# Patient Record
Sex: MALE | Race: WHITE | Employment: FULL TIME | URBAN - METROPOLITAN AREA
[De-identification: names, ages, dates, MRNs, and addresses within clinical notes are randomized per-mention and may not be internally consistent; named-entity substitution may affect disease eponyms.]

---

## 2020-09-25 ENCOUNTER — HOSPITAL ENCOUNTER (OUTPATIENT)
Dept: PREADMISSION TESTING | Age: 70
Discharge: HOME | End: 2020-09-25
Payer: MEDICARE

## 2020-09-25 VITALS
OXYGEN SATURATION: 94 % | DIASTOLIC BLOOD PRESSURE: 73 MMHG | SYSTOLIC BLOOD PRESSURE: 143 MMHG | WEIGHT: 235.89 LBS | HEIGHT: 72 IN | TEMPERATURE: 98.3 F | BODY MASS INDEX: 31.95 KG/M2 | HEART RATE: 64 BPM

## 2020-09-25 DIAGNOSIS — Z22.321 MSSA (METHICILLIN-SUSCEPTIBLE STAPH AUREUS) CARRIER: Primary | ICD-10-CM

## 2020-09-25 LAB
ABO + RH BLD: NORMAL
ANION GAP SERPL CALC-SCNC: 3 MMOL/L (ref 5–15)
APPEARANCE UR: CLEAR
ATRIAL RATE: 54 BPM
BACTERIA URNS QL MICRO: NEGATIVE /HPF
BILIRUB UR QL: NEGATIVE
BLOOD GROUP ANTIBODIES SERPL: NORMAL
BUN SERPL-MCNC: 23 MG/DL (ref 6–20)
BUN/CREAT SERPL: 22 (ref 12–20)
CALCIUM SERPL-MCNC: 9.1 MG/DL (ref 8.5–10.1)
CALCULATED P AXIS, ECG09: 65 DEGREES
CALCULATED T AXIS, ECG11: 32 DEGREES
CHLORIDE SERPL-SCNC: 109 MMOL/L (ref 97–108)
CO2 SERPL-SCNC: 28 MMOL/L (ref 21–32)
COLOR UR: NORMAL
CREAT SERPL-MCNC: 1.04 MG/DL (ref 0.7–1.3)
DIAGNOSIS, 93000: NORMAL
EPITH CASTS URNS QL MICRO: NORMAL /LPF
ERYTHROCYTE [DISTWIDTH] IN BLOOD BY AUTOMATED COUNT: 12.2 % (ref 11.5–14.5)
EST. AVERAGE GLUCOSE BLD GHB EST-MCNC: 108 MG/DL
GLUCOSE SERPL-MCNC: 118 MG/DL (ref 65–100)
GLUCOSE UR STRIP.AUTO-MCNC: NEGATIVE MG/DL
HBA1C MFR BLD: 5.4 % (ref 4–5.6)
HCT VFR BLD AUTO: 42.8 % (ref 36.6–50.3)
HGB BLD-MCNC: 14.5 G/DL (ref 12.1–17)
HGB UR QL STRIP: NEGATIVE
HYALINE CASTS URNS QL MICRO: NORMAL /LPF (ref 0–5)
INR PPP: 1 (ref 0.9–1.1)
KETONES UR QL STRIP.AUTO: NEGATIVE MG/DL
LEUKOCYTE ESTERASE UR QL STRIP.AUTO: NEGATIVE
MCH RBC QN AUTO: 31 PG (ref 26–34)
MCHC RBC AUTO-ENTMCNC: 33.9 G/DL (ref 30–36.5)
MCV RBC AUTO: 91.5 FL (ref 80–99)
NITRITE UR QL STRIP.AUTO: NEGATIVE
NRBC # BLD: 0 K/UL (ref 0–0.01)
NRBC BLD-RTO: 0 PER 100 WBC
P-R INTERVAL, ECG05: 220 MS
PH UR STRIP: 5 [PH] (ref 5–8)
PLATELET # BLD AUTO: 221 K/UL (ref 150–400)
PMV BLD AUTO: 9.6 FL (ref 8.9–12.9)
POTASSIUM SERPL-SCNC: 4.2 MMOL/L (ref 3.5–5.1)
PROT UR STRIP-MCNC: NEGATIVE MG/DL
PROTHROMBIN TIME: 10.6 SEC (ref 9–11.1)
Q-T INTERVAL, ECG07: 414 MS
QRS DURATION, ECG06: 88 MS
QTC CALCULATION (BEZET), ECG08: 392 MS
RBC # BLD AUTO: 4.68 M/UL (ref 4.1–5.7)
RBC #/AREA URNS HPF: NORMAL /HPF (ref 0–5)
SODIUM SERPL-SCNC: 140 MMOL/L (ref 136–145)
SP GR UR REFRACTOMETRY: 1.02 (ref 1–1.03)
SPECIMEN EXP DATE BLD: NORMAL
UA: UC IF INDICATED,UAUC: NORMAL
UROBILINOGEN UR QL STRIP.AUTO: 0.2 EU/DL (ref 0.2–1)
VENTRICULAR RATE, ECG03: 54 BPM
WBC # BLD AUTO: 4.8 K/UL (ref 4.1–11.1)
WBC URNS QL MICRO: NORMAL /HPF (ref 0–4)

## 2020-09-25 PROCEDURE — 83036 HEMOGLOBIN GLYCOSYLATED A1C: CPT

## 2020-09-25 PROCEDURE — 93005 ELECTROCARDIOGRAM TRACING: CPT

## 2020-09-25 PROCEDURE — 86900 BLOOD TYPING SEROLOGIC ABO: CPT

## 2020-09-25 PROCEDURE — 85610 PROTHROMBIN TIME: CPT

## 2020-09-25 PROCEDURE — 81001 URINALYSIS AUTO W/SCOPE: CPT

## 2020-09-25 PROCEDURE — 80048 BASIC METABOLIC PNL TOTAL CA: CPT

## 2020-09-25 PROCEDURE — 85027 COMPLETE CBC AUTOMATED: CPT

## 2020-09-25 RX ORDER — BISMUTH SUBSALICYLATE 262 MG
1 TABLET,CHEWABLE ORAL DAILY
COMMUNITY

## 2020-09-25 RX ORDER — OMEGA-3 FATTY ACIDS/FISH OIL 300-500 MG
1 CAPSULE ORAL DAILY
COMMUNITY

## 2020-09-25 RX ORDER — SILDENAFIL 100 MG/1
100 TABLET, FILM COATED ORAL AS NEEDED
COMMUNITY

## 2020-09-25 RX ORDER — DICLOFENAC SODIUM 50 MG/1
50 TABLET, DELAYED RELEASE ORAL AS NEEDED
COMMUNITY

## 2020-09-25 NOTE — PERIOP NOTES
Preoperative instructions reviewed with patient. Patient given two bottles of CHG soap. Instructions (reviewed/to be reviewed in class) on use of CHG soap. Patient given SSI infection FAQS sheet, as well as a MRSA/MSSA treatment instruction sheet with an explanation to patient that they will be notified if treatment instructions need to be initiated. Patient was given the opportunity to ask questions on the information provided. INFORMATION REGARDING COVID 19 TESTING PRIOR TO SURGERY WAS PROVIDED TO THE PATIENT WITH THE OPPORTUNITY FOR QUESTIONS. PATIENT VERBALIZED UNDERSTANDING.

## 2020-09-25 NOTE — PERIOP NOTES
PAT Nurse Practitioner   Pre-Operative Chart Review/Assessment:-ORTHOPEDIC                Patient Name:  Manuel Galarza. Age:   78 y.o.    :  1950     Today's Date:  2020     Date of PAT:   2020      Date of Surgery:    10/2/2020      Procedure(s):  Left Total Knee Arthroplasty     Surgeon:   Dr. Ana Rosa Hernandez                       PLAN:      1)  Medical Clearance:  Dr. Gregorio Santos      2)  Cardiac Clearance:  Not requested. 3)  Diabetic Treatment Consult:  Not indicated. A1c-5.4      4)  Sleep Apnea evaluation:   Not indicated. MIRANDA Score 3.       5) Treatment for MRSA/Staph Aureus:  +MSSA. Tx w/ mupirocin. 6) Additional Concerns: Mod EtOH (2 drinks/day)                Vital Signs:         Vitals:    20 0938   BP: (!) 143/73   Pulse: 64   Temp: 98.3 °F (36.8 °C)   SpO2: 94%   Weight: 107 kg (235 lb 14.3 oz)   Height: 6' (1.829 m)            ____________________________________________  PAST MEDICAL HISTORY  Past Medical History:   Diagnosis Date    Arthritis     Cancer ([address] 2018    SKIN CANCER      ____________________________________________  PAST SURGICAL HISTORY  Past Surgical History:   Procedure Laterality Date    HX ORTHOPAEDIC  1981    cervical fusion    HX ORTHOPAEDIC Left     SCREWS PLACED IN LEFT CLAVICLE    HX VASECTOMY        ____________________________________________  HOME MEDICATIONS  Current Outpatient Medications   Medication Sig    mupirocin (BACTROBAN) 2 % ointment by Both Nostrils route two (2) times a day for 7 days.  mupirocin (BACTROBAN) 2 % ointment by Both Nostrils route two (2) times a day. Indications: MSSA    sildenafil citrate (VIAGRA) 100 mg tablet Take 100 mg by mouth as needed for Erectile Dysfunction.  multivitamin (ONE A DAY) tablet Take 1 Tab by mouth daily.  fish oil-omega-3 fatty acids (Fish Oil) 300-500 mg cap Take 1 Cap by mouth daily.     diclofenac EC (VOLTAREN) 50 mg EC tablet Take 50 mg by mouth as needed for Pain. No current facility-administered medications for this encounter.       ____________________________________________  ALLERGIES  No Known Allergies   ____________________________________________  SOCIAL HISTORY  Social History     Tobacco Use    Smoking status: Never Smoker    Smokeless tobacco: Never Used   Substance Use Topics    Alcohol use: Yes     Alcohol/week: 15.0 standard drinks     Types: 14 Cans of beer, 1 Glasses of wine per week      ____________________________________________        Labs:     Hospital Outpatient Visit on 09/25/2020   Component Date Value Ref Range Status    Sodium 09/25/2020 140  136 - 145 mmol/L Final    Potassium 09/25/2020 4.2  3.5 - 5.1 mmol/L Final    Chloride 09/25/2020 109* 97 - 108 mmol/L Final    CO2 09/25/2020 28  21 - 32 mmol/L Final    Anion gap 09/25/2020 3* 5 - 15 mmol/L Final    Glucose 09/25/2020 118* 65 - 100 mg/dL Final    BUN 09/25/2020 23* 6 - 20 MG/DL Final    Creatinine 09/25/2020 1.04  0.70 - 1.30 MG/DL Final    BUN/Creatinine ratio 09/25/2020 22* 12 - 20   Final    GFR est AA 09/25/2020 >60  >60 ml/min/1.73m2 Final    GFR est non-AA 09/25/2020 >60  >60 ml/min/1.73m2 Final    Estimated GFR is calculated using the IDMS-traceable Modification of Diet in Renal Disease (MDRD) Study equation, reported for both  Americans (GFRAA) and non- Americans (GFRNA), and normalized to 1.73m2 body surface area. The physician must decide which value applies to the patient.     Calcium 09/25/2020 9.1  8.5 - 10.1 MG/DL Final    WBC 09/25/2020 4.8  4.1 - 11.1 K/uL Final    RBC 09/25/2020 4.68  4.10 - 5.70 M/uL Final    HGB 09/25/2020 14.5  12.1 - 17.0 g/dL Final    HCT 09/25/2020 42.8  36.6 - 50.3 % Final    MCV 09/25/2020 91.5  80.0 - 99.0 FL Final    MCH 09/25/2020 31.0  26.0 - 34.0 PG Final    MCHC 09/25/2020 33.9  30.0 - 36.5 g/dL Final    RDW 09/25/2020 12.2  11.5 - 14.5 % Final    PLATELET 09/25/2020 221  150 - 400 K/uL Final    MPV 09/25/2020 9.6  8.9 - 12.9 FL Final    NRBC 09/25/2020 0.0  0  WBC Final    ABSOLUTE NRBC 09/25/2020 0.00  0.00 - 0.01 K/uL Final    Crossmatch Expiration 09/25/2020 10/05/2020   Final    ABO/Rh(D) 09/25/2020 A NEGATIVE   Final    Antibody screen 09/25/2020 NEG   Final    INR 09/25/2020 1.0  0.9 - 1.1   Final    A single therapeutic range for Vit K antagonists may not be optimal for all indications - see June, 2008 issue of Chest, American College of Chest [employer]icians Evidence-Based Clinical Practice Guidelines, 8th Edition.  Prothrombin time 09/25/2020 10.6  9.0 - 11.1 sec Final    Color 09/25/2020 YELLOW/STRAW    Final    Color Reference Range: Straw, Yellow or Dark Yellow    Appearance 09/25/2020 CLEAR  CLEAR   Final    Specific gravity 09/25/2020 1.021  1.003 - 1.030   Final    pH (UA) 09/25/2020 5.0  5.0 - 8.0   Final    Protein 09/25/2020 Negative  NEG mg/dL Final    Glucose 09/25/2020 Negative  NEG mg/dL Final    Ketone 09/25/2020 Negative  NEG mg/dL Final    Bilirubin 09/25/2020 Negative  NEG   Final    Blood 09/25/2020 Negative  NEG   Final    Urobilinogen 09/25/2020 0.2  0.2 - 1.0 EU/dL Final    Nitrites 09/25/2020 Negative  NEG   Final    Leukocyte Esterase 09/25/2020 Negative  NEG   Final    UA:UC IF INDICATED 09/25/2020 CULTURE NOT INDICATED BY UA RESULT  CNI   Final    WBC 09/25/2020 0-4  0 - 4 /hpf Final    RBC 09/25/2020 0-5  0 - 5 /hpf Final    Epithelial cells 09/25/2020 FEW  FEW /lpf Final    Epithelial cell category consists of squamous cells and /or transitional urothelial cells. Renal tubular cells, if present, are separately identified as such.     Bacteria 09/25/2020 Negative  NEG /hpf Final    Hyaline cast 09/25/2020 0-2  0 - 5 /lpf Final    Ventricular Rate 09/25/2020 54  BPM Final    Atrial Rate 09/25/2020 54  BPM Final    P-R Interval 09/25/2020 220  ms Final    QRS Duration 09/25/2020 88  ms Final    Q-T Interval 09/25/2020 414  ms Final    QTC Calculation (Bezet) 09/25/2020 392  ms Final    Calculated P Axis 09/25/2020 65  degrees Final    Calculated T Axis 09/25/2020 32  degrees Final    Diagnosis 09/25/2020    Final                    Value:Sinus bradycardia with 1st degree AV block  Otherwise normal ECG  No previous ECGs available  Confirmed by Rosenda Barton MD. ([id number]) on 9/25/2020 4:05:03 PM      Hemoglobin A1c 09/25/2020 5.4  4.0 - 5.6 % Final    Comment: NEW METHOD  PLEASE NOTE NEW REFERENCE RANGE  (NOTE)  HbA1C Interpretive Ranges  <5.7              Normal  5.7 - 6.4         Consider Prediabetes  >6.5              Consider Diabetes      Est. average glucose 09/25/2020 108  mg/dL Final    Special Requests: 09/25/2020 NO SPECIAL REQUESTS    Final    Culture result: 09/25/2020 MRSA NOT PRESENT. Apparent Staphylococus aureus (not MRSA noted). Final       Skin:     Denies open wounds, cuts, sores, rashes or other areas of concern in PAT assessment.           Mega Marino NP

## 2020-09-26 LAB
BACTERIA SPEC CULT: NORMAL
BACTERIA SPEC CULT: NORMAL
SERVICE CMNT-IMP: NORMAL

## 2020-09-28 ENCOUNTER — HOSPITAL ENCOUNTER (OUTPATIENT)
Dept: PREADMISSION TESTING | Age: 70
Discharge: HOME | End: 2020-09-28
Payer: MEDICARE

## 2020-09-28 ENCOUNTER — TRANSCRIBE ORDER (OUTPATIENT)
Dept: REGISTRATION | Age: 70
End: 2020-09-28

## 2020-09-28 DIAGNOSIS — Z01.812 PRE-PROCEDURE LAB EXAM: Primary | ICD-10-CM

## 2020-09-28 DIAGNOSIS — Z01.812 PRE-PROCEDURE LAB EXAM: ICD-10-CM

## 2020-09-28 PROCEDURE — 87635 SARS-COV-2 COVID-19 AMP PRB: CPT

## 2020-09-28 RX ORDER — MUPIROCIN 20 MG/G
OINTMENT TOPICAL 2 TIMES DAILY
Qty: 22 G | Refills: 0 | Status: SHIPPED | OUTPATIENT
Start: 2020-09-28 | End: 2020-10-03

## 2020-09-28 RX ORDER — MUPIROCIN 20 MG/G
OINTMENT TOPICAL 2 TIMES DAILY
COMMUNITY
Start: 2020-09-28 | End: 2020-10-03

## 2020-09-28 NOTE — PERIOP NOTES
PC to pt regarding positive nasal cx (MSSA) and need to start Mupirocin ointment BID x 7 days to B nostrils starting today and bathe with CHG soap for 7 days prior to surgery. Pt verbalized understanding of instructions and will start today as recommended. Allergies and pharmacy of choice reviewed. Rx escribed to pt's pharmacy of choice. PTA medlist updated. Surgeon and PCP notified of positive culture and treatment.      Candelaria Cleveland NP

## 2020-09-29 LAB — SARS-COV-2, COV2NT: NOT DETECTED

## 2020-10-01 NOTE — H&P
Patient ID: Whit Moreira. is a 81 y.o. male. Pain Score:   4  Chief Complaint: Pain of the Left Knee and Pain of the Right Knee        59-year-old male with history of bilateral knee pain well known to me for severe osteoarthritis his bilateral knees. He has not been in to see us in about 3 years. He now is having increasing symptoms in his pain is bothersome when he tries to climb stairs and walk for long distances. He can not do hikes anymore and 1 of the things that got him in today was the fact that he can not a vacation because of concern about whether he could to do some things that he needed to. He also occasionally has problems sleeping at nighttime. Comes in for evaluation. His left knee is much worse than his right 1 is period     Medical History        Past Medical History:   Diagnosis Date    no relevant past medical history           Surgical History         Past Surgical History:   Procedure Laterality Date    NO RELEVANT SURGERIES        SPINE SURGERY                   Family History   Problem Relation Age of Onset    No Known Problems Mother      No Known Problems Father      No Known Problems Brother      No Known Problems Sister        Social History           Tobacco Use    Smoking status: Never Smoker    Smokeless tobacco: Never Used   Substance Use Topics    Alcohol use: Yes    Drug use: Never      Review of Systems   4/20/2020     Constitutional: Unexplained: Negative  Genitourinary: Frequent Urination: Negative  HEENT: Vision Loss: Negative  Neurological: Memory Loss: Negative  Integumentary: Rash: Negative  Cardiovascular: Palpatations: Negative  Hematologic: Bruises/Bleeds Easily: Negative  Gastrointestinal: Constipation: Negative  Immunological: Seasonal Allergies: Negative  Musculoskeletal: Joint Pain: Positive  Objective:      Vitals       Vitals:     04/20/20 0916   Weight: 225 lb   Height: 6'         Constitutional:  No acute distress. Well nourished.  Well developed. Psychiatric: Alert and oriented x3. Skin:  No marked skin ulcers. Neurological:  No apparent deficits       Patient Active Problem List    Diagnosis Date Noted    Degenerative arthritis of left knee 10/02/2020     Past Medical History:   Diagnosis Date    Arthritis     Cancer ([facility] [address] 2018    SKIN CANCER      Past Surgical History:   Procedure Laterality Date    HX ORTHOPAEDIC  1981    cervical fusion    HX ORTHOPAEDIC Left 2012    SCREWS PLACED IN LEFT CLAVICLE    HX VASECTOMY        Prior to Admission medications    Medication Sig Start Date End Date Taking? Authorizing Provider   mupirocin (BACTROBAN) 2 % ointment by Both Nostrils route two (2) times a day for 7 days. 9/28/20 10/5/20 Yes Dania Quill, NP   multivitamin (ONE A DAY) tablet Take 1 Tab by mouth daily. Yes Provider, Historical   fish oil-omega-3 fatty acids (Fish Oil) 300-500 mg cap Take 1 Cap by mouth daily. Yes Provider, Historical   mupirocin (BACTROBAN) 2 % ointment by Both Nostrils route two (2) times a day. Indications: MSSA 9/28/20 10/5/20  Dania Damonill, NP   sildenafil citrate (VIAGRA) 100 mg tablet Take 100 mg by mouth as needed for Erectile Dysfunction. Provider, Historical   diclofenac EC (VOLTAREN) 50 mg EC tablet Take 50 mg by mouth as needed for Pain. Provider, Historical     No Known Allergies   Social History     Tobacco Use    Smoking status: Never Smoker    Smokeless tobacco: Never Used   Substance Use Topics    Alcohol use:  Yes     Alcohol/week: 15.0 standard drinks     Types: 14 Cans of beer, 1 Glasses of wine per week      Family History   Problem Relation Age of Onset    Hypertension Father     Heart Disease Father     Arthritis-osteo Mother     No Known Problems Sister     Anesth Problems Neg Hx             Patient Vitals for the past 8 hrs:   BP Temp Pulse Resp SpO2 Height Weight   10/02/20 0715 132/88 -- (!) 52 16 96 % -- --   10/02/20 0708 138/86 -- (!) 55 16 97 % -- --   10/02/20 6439 (!) 145/94 98 °F (36.7 °C) 62 16 94 % 6' (1.829 m) 107 kg (235 lb 14.3 oz)     General appearance: alert, cooperative, no distress, appears stated age  Head: Normocephalic, without obvious abnormality, atraumatic  Lungs: clear to auscultation bilaterally  Heart: regular rate and rhythm, S1, S2 normal, no murmur  Abdomen: soft, non-tender. Bowel sounds normal. No masses,  no organomegaly  Extremities: Walks with an antalgic gait on his left knee with a little bit of a bent knee gait today. Left knee:  No skin changes, rashes, erythema, deformity, or bruising. Minimal to moderate effusion. Tenderness in the medial joint line. Mild varus alignment. Pseudo opening on valgus stress testing. No opening on varus or anterior-posterior stress testing. Crepitance along the medial joint line. Negative Lachman's negative drawer. Normal patellar tracking. Good quad and hamstring strength good pulses good sensation good cap refill and no edema distally. Range of motion is lacking last 3-5 degrees of extension. Right knee:  No skin changes, rashes, erythema, deformity, or bruising. Minimal effusion. Tenderness in the medial joint line. Mild varus alignment. Pseudo opening on valgus stress testing. No opening on varus or anterior-posterior stress testing. No crepitance along the medial joint line. Negative Lachman's negative drawer. Normal patellar tracking. Good quad and hamstring strength good pulses good sensation good cap refill and no edema distally. Range of motion is basically full.   Pulses: 2+ and symmetric  Neurologic: Grossly normal          \  Procedures:    Large Joint Arthrocentesis: L knee  Consent given by: patient  Timeout: Immediately prior to procedure a time out was called to verify the correct patient, procedure, equipment, support staff and site/side marked as required   Supporting Documentation  Indications: pain   Procedure Details  Location: Knee L knee   Preparation: Patient was prepped and draped in the usual sterile fashion. Additional Procedural Details:  Risks and benefits of cortisone injection discussed and patient in agreement to proceed. Needle size: 25 G  Approach: lateral  Patient tolerance: patient tolerated the procedure well with no immediate complications        Medications administered: 1 mL Betamethasone 6 (3-3) MG/ML; 2 mL bupivacaine 0.5 %     Patient Education: Cortisone Flare Risk Discussed and Diabetes Risk Discussed  Large Joint Arthrocentesis: R knee  Consent given by: patient  Timeout: Immediately prior to procedure a time out was called to verify the correct patient, procedure, equipment, support staff and site/side marked as required   Supporting Documentation  Indications: pain   Procedure Details  Location: Knee R knee   Preparation: Patient was prepped and draped in the usual sterile fashion. Additional Procedural Details:  Risks and benefits of cortisone injection discussed and patient in agreement to proceed. Needle size: 25 G  Approach: lateral  Patient tolerance: patient tolerated the procedure well with no immediate complications        Medications administered: 1 mL Betamethasone 6 (3-3) MG/ML; 2 mL bupivacaine 0.5 %     Patient Education: Cortisone Flare Risk Discussed            Radiographs:     Order: XR KNEE 3 VW RIGHT - Indication: Chronic pain of right knee  Order: XR KNEE 3 VW LEFT - Indication: Chronic pain of left knee      X-ray Knee Left 3 Views ([id number])     Result Date: 4/20/2020  Views: DARSHAN Pastrana.      Impression: Findings:  Medial compartment arthritis with joint space narrowing that is that is severe. Medial osteophytes visible. There is a medial shift of the femur on the tibia. There is wear of the medial tibia. Patellofemoral and lateral compartment are not involved.   There is no evidence congenital deformity or masses, and no fractures or dislocations seen.     X-ray Knee Right 3 Views ([id number])     Result Date: 4/20/2020  Views: Lat, AP, Sunrise.      Impression: Findings:  Medial compartment arthritis with medial joint space narrowing that is moderate to severe. Medial joint line spurring visible. There is a slight medial shift of the femur on the tibia. Patellofemoral joint and lateral compartment are not involved. Otherwise normal anatomic alignment of bones with no evidence of congenital deformity or masses, and no fractures or dislocations seen.        Assessment:      1. Chronic pain of left knee    2. Chronic pain of right knee    3. Primary osteoarthritis of both knees       There is no problem list on file for this patient.     Plan:      We had a long discussion today about when the appropriate time for knee replacement is. He is having symptoms that are affecting his quality of life and after thoroughly discussing it it is significantly bothersome. He is ready to have his left knee replaced. He has failed conservative management with NSAIDs Injectable corticosteroids, bracing, activity modification and assist devices without success. We talked at length about knee replacement. Total knee replacement was discussed with the patient today including the risks benefits and possible complications. It was discussed with them that the surgery is done on same day surgery basis. The patient will arrive the day of surgery. Surgery will be done under spinal and block with sedation. The main risks of the operation are blood loss infection and persistent pain. Surgery would last approximately an hour and a half the patient was then go to the floor with a would be expected to walk on the 1st day. Prior to discharge that would be required to walk a certain distance be able to get up on their own and ambulate stairs. Patient would be required to be on a blood thinner after surgery. This will be required for at least 4 weeks postop. Will complete recovery is expected take 3-6 months.   All this was explained to the patient they voiced complete understanding. It would be expected that the patient would require postoperative pain medicine for up to 8 weeks after surgery. Patient desired to go forward with surgery and will be schedule. Unfortunately because of the COVID-19 restrictions were not continue to do this any time soon. We injected both knees today because of the COVID-19 restrictions but he would like to schedule as soon as possible. We will talk about possibly doing this in July which be the next top possible date or maybe in the fall depending on how his schedule as. He may come in July to get injections before he goes on vacation.         Orders Placed This Encounter    X-ray knee right 3 views ([id number])    X-ray knee left 3 views ([id number])    BMI >=25 PATIENT INSTRUCTIONS & EDUCATION      Return if symptoms worsen or fail to improve, for injection, surgery. Qasim Curtis MD            Patient was seen and examined. There have been no significant clinical changes since the completion of the above History and Physical.  Patient identified by surgeon; surgical site was confirmed by patient and surgeon.

## 2020-10-02 ENCOUNTER — APPOINTMENT (OUTPATIENT)
Dept: GENERAL RADIOLOGY | Age: 70
End: 2020-10-02
Attending: ORTHOPAEDIC SURGERY
Payer: MEDICARE

## 2020-10-02 ENCOUNTER — ANESTHESIA EVENT (OUTPATIENT)
Dept: SURGERY | Age: 70
End: 2020-10-02
Payer: MEDICARE

## 2020-10-02 ENCOUNTER — HOSPITAL ENCOUNTER (OUTPATIENT)
Age: 70
Setting detail: OBSERVATION
Discharge: HOME | End: 2020-10-03
Attending: ORTHOPAEDIC SURGERY | Admitting: ORTHOPAEDIC SURGERY
Payer: MEDICARE

## 2020-10-02 ENCOUNTER — ANESTHESIA (OUTPATIENT)
Dept: SURGERY | Age: 70
End: 2020-10-02
Payer: MEDICARE

## 2020-10-02 DIAGNOSIS — M17.12 PRIMARY OSTEOARTHRITIS OF LEFT KNEE: Primary | ICD-10-CM

## 2020-10-02 LAB
GLUCOSE BLD STRIP.AUTO-MCNC: 117 MG/DL (ref 65–100)
SERVICE CMNT-IMP: ABNORMAL

## 2020-10-02 PROCEDURE — 74011000250 HC RX REV CODE- 250: Performed by: PHYSICIAN ASSISTANT

## 2020-10-02 PROCEDURE — 74011000250 HC RX REV CODE- 250: Performed by: NURSE ANESTHETIST, CERTIFIED REGISTERED

## 2020-10-02 PROCEDURE — 74011250636 HC RX REV CODE- 250/636: Performed by: NURSE ANESTHETIST, CERTIFIED REGISTERED

## 2020-10-02 PROCEDURE — 73560 X-RAY EXAM OF KNEE 1 OR 2: CPT

## 2020-10-02 PROCEDURE — 74011250637 HC RX REV CODE- 250/637: Performed by: PHYSICIAN ASSISTANT

## 2020-10-02 PROCEDURE — 99218 HC RM OBSERVATION: CPT

## 2020-10-02 PROCEDURE — 74011250636 HC RX REV CODE- 250/636: Performed by: PHYSICIAN ASSISTANT

## 2020-10-02 PROCEDURE — 2709999900 HC NON-CHARGEABLE SUPPLY

## 2020-10-02 PROCEDURE — 77030018836 HC SOL IRR NACL ICUM -A: Performed by: ORTHOPAEDIC SURGERY

## 2020-10-02 PROCEDURE — 74011000258 HC RX REV CODE- 258: Performed by: NURSE ANESTHETIST, CERTIFIED REGISTERED

## 2020-10-02 PROCEDURE — 77030039497 HC CST PAD STERILE CHCS -A: Performed by: ORTHOPAEDIC SURGERY

## 2020-10-02 PROCEDURE — 77030006835 HC BLD SAW SAG STRY -B: Performed by: ORTHOPAEDIC SURGERY

## 2020-10-02 PROCEDURE — 77030002912 HC SUT ETHBND J&J -A: Performed by: ORTHOPAEDIC SURGERY

## 2020-10-02 PROCEDURE — 74011000250 HC RX REV CODE- 250

## 2020-10-02 PROCEDURE — 74011000258 HC RX REV CODE- 258: Performed by: PHYSICIAN ASSISTANT

## 2020-10-02 PROCEDURE — C9290 INJ, BUPIVACAINE LIPOSOME: HCPCS | Performed by: ORTHOPAEDIC SURGERY

## 2020-10-02 PROCEDURE — 77030018673: Performed by: ORTHOPAEDIC SURGERY

## 2020-10-02 PROCEDURE — 74011250636 HC RX REV CODE- 250/636: Performed by: ANESTHESIOLOGY

## 2020-10-02 PROCEDURE — 77030014077 HC TOWER MX CEM J&J -C: Performed by: ORTHOPAEDIC SURGERY

## 2020-10-02 PROCEDURE — 74011000258 HC RX REV CODE- 258: Performed by: ORTHOPAEDIC SURGERY

## 2020-10-02 PROCEDURE — 77030027138 HC INCENT SPIROMETER -A

## 2020-10-02 PROCEDURE — 82962 GLUCOSE BLOOD TEST: CPT

## 2020-10-02 PROCEDURE — 97161 PT EVAL LOW COMPLEX 20 MIN: CPT | Performed by: PHYSICAL THERAPIST

## 2020-10-02 PROCEDURE — C1713 ANCHOR/SCREW BN/BN,TIS/BN: HCPCS | Performed by: ORTHOPAEDIC SURGERY

## 2020-10-02 PROCEDURE — 76060000036 HC ANESTHESIA 2.5 TO 3 HR: Performed by: ORTHOPAEDIC SURGERY

## 2020-10-02 PROCEDURE — 76010000172 HC OR TIME 2.5 TO 3 HR INTENSV-TIER 1: Performed by: ORTHOPAEDIC SURGERY

## 2020-10-02 PROCEDURE — 74011000250 HC RX REV CODE- 250: Performed by: ANESTHESIOLOGY

## 2020-10-02 PROCEDURE — 74011000250 HC RX REV CODE- 250: Performed by: ORTHOPAEDIC SURGERY

## 2020-10-02 PROCEDURE — 77030035236 HC SUT PDS STRATFX BARB J&J -B: Performed by: ORTHOPAEDIC SURGERY

## 2020-10-02 PROCEDURE — 77030006822 HC BLD SAW SAG BRSM -B: Performed by: ORTHOPAEDIC SURGERY

## 2020-10-02 PROCEDURE — 77030005513 HC CATH URETH FOL11 MDII -B: Performed by: ORTHOPAEDIC SURGERY

## 2020-10-02 PROCEDURE — 97116 GAIT TRAINING THERAPY: CPT | Performed by: PHYSICAL THERAPIST

## 2020-10-02 PROCEDURE — 74011250637 HC RX REV CODE- 250/637: Performed by: ORTHOPAEDIC SURGERY

## 2020-10-02 PROCEDURE — 74011250636 HC RX REV CODE- 250/636: Performed by: ORTHOPAEDIC SURGERY

## 2020-10-02 PROCEDURE — 77030002933 HC SUT MCRYL J&J -A: Performed by: ORTHOPAEDIC SURGERY

## 2020-10-02 PROCEDURE — 77030031139 HC SUT VCRL2 J&J -A: Performed by: ORTHOPAEDIC SURGERY

## 2020-10-02 PROCEDURE — C1776 JOINT DEVICE (IMPLANTABLE): HCPCS | Performed by: ORTHOPAEDIC SURGERY

## 2020-10-02 PROCEDURE — 76210000006 HC OR PH I REC 0.5 TO 1 HR: Performed by: ORTHOPAEDIC SURGERY

## 2020-10-02 PROCEDURE — 77030040361 HC SLV COMPR DVT MDII -B: Performed by: ORTHOPAEDIC SURGERY

## 2020-10-02 PROCEDURE — 2709999900 HC NON-CHARGEABLE SUPPLY: Performed by: ORTHOPAEDIC SURGERY

## 2020-10-02 PROCEDURE — 77030028907 HC WRP KNEE WO BGS SOLM -B

## 2020-10-02 DEVICE — IMPLANTABLE DEVICE: Type: IMPLANTABLE DEVICE | Site: KNEE | Status: FUNCTIONAL

## 2020-10-02 DEVICE — PAT PSN VE POLY 35MM -- PERSONA: Type: IMPLANTABLE DEVICE | Site: KNEE | Status: FUNCTIONAL

## 2020-10-02 DEVICE — KNEE K1 TOT HEMI STD CEM IMPL CAPPED K1 ZIM: Type: IMPLANTABLE DEVICE | Status: FUNCTIONAL

## 2020-10-02 RX ORDER — ASPIRIN 325 MG
325 TABLET, DELAYED RELEASE (ENTERIC COATED) ORAL 2 TIMES DAILY
Status: DISCONTINUED | OUTPATIENT
Start: 2020-10-02 | End: 2020-10-03 | Stop reason: HOSPADM

## 2020-10-02 RX ORDER — SODIUM CHLORIDE 0.9 % (FLUSH) 0.9 %
5-40 SYRINGE (ML) INJECTION EVERY 8 HOURS
Status: DISCONTINUED | OUTPATIENT
Start: 2020-10-02 | End: 2020-10-02 | Stop reason: HOSPADM

## 2020-10-02 RX ORDER — SODIUM CHLORIDE 0.9 % (FLUSH) 0.9 %
5-40 SYRINGE (ML) INJECTION EVERY 8 HOURS
Status: DISCONTINUED | OUTPATIENT
Start: 2020-10-02 | End: 2020-10-03 | Stop reason: HOSPADM

## 2020-10-02 RX ORDER — FAMOTIDINE 20 MG/1
20 TABLET, FILM COATED ORAL 2 TIMES DAILY
Status: DISCONTINUED | OUTPATIENT
Start: 2020-10-02 | End: 2020-10-03 | Stop reason: HOSPADM

## 2020-10-02 RX ORDER — ONDANSETRON 2 MG/ML
4 INJECTION INTRAMUSCULAR; INTRAVENOUS AS NEEDED
Status: DISCONTINUED | OUTPATIENT
Start: 2020-10-02 | End: 2020-10-02 | Stop reason: HOSPADM

## 2020-10-02 RX ORDER — MIDAZOLAM HYDROCHLORIDE 1 MG/ML
1 INJECTION, SOLUTION INTRAMUSCULAR; INTRAVENOUS AS NEEDED
Status: DISCONTINUED | OUTPATIENT
Start: 2020-10-02 | End: 2020-10-02 | Stop reason: HOSPADM

## 2020-10-02 RX ORDER — OXYCODONE HYDROCHLORIDE 5 MG/1
5 TABLET ORAL
Status: DISCONTINUED | OUTPATIENT
Start: 2020-10-02 | End: 2020-10-03 | Stop reason: HOSPADM

## 2020-10-02 RX ORDER — EPHEDRINE SULFATE/0.9% NACL/PF 50 MG/5 ML
5 SYRINGE (ML) INTRAVENOUS ONCE
Status: COMPLETED | OUTPATIENT
Start: 2020-10-02 | End: 2020-10-02

## 2020-10-02 RX ORDER — OXYCODONE HYDROCHLORIDE 5 MG/1
10 TABLET ORAL
Status: DISCONTINUED | OUTPATIENT
Start: 2020-10-02 | End: 2020-10-03 | Stop reason: HOSPADM

## 2020-10-02 RX ORDER — ROPIVACAINE HYDROCHLORIDE 5 MG/ML
30 INJECTION, SOLUTION EPIDURAL; INFILTRATION; PERINEURAL AS NEEDED
Status: DISCONTINUED | OUTPATIENT
Start: 2020-10-02 | End: 2020-10-02 | Stop reason: HOSPADM

## 2020-10-02 RX ORDER — GLYCOPYRROLATE 0.2 MG/ML
0.4 INJECTION INTRAMUSCULAR; INTRAVENOUS ONCE
Status: COMPLETED | OUTPATIENT
Start: 2020-10-02 | End: 2020-10-02

## 2020-10-02 RX ORDER — MORPHINE SULFATE 10 MG/ML
2 INJECTION, SOLUTION INTRAMUSCULAR; INTRAVENOUS
Status: DISCONTINUED | OUTPATIENT
Start: 2020-10-02 | End: 2020-10-02 | Stop reason: HOSPADM

## 2020-10-02 RX ORDER — OXYCODONE HYDROCHLORIDE 5 MG/1
5 TABLET ORAL AS NEEDED
Status: DISCONTINUED | OUTPATIENT
Start: 2020-10-02 | End: 2020-10-02 | Stop reason: HOSPADM

## 2020-10-02 RX ORDER — BUPIVACAINE HYDROCHLORIDE 5 MG/ML
INJECTION, SOLUTION EPIDURAL; INTRACAUDAL
Status: COMPLETED | OUTPATIENT
Start: 2020-10-02 | End: 2020-10-02

## 2020-10-02 RX ORDER — SODIUM CHLORIDE 0.9 % (FLUSH) 0.9 %
5-40 SYRINGE (ML) INJECTION AS NEEDED
Status: DISCONTINUED | OUTPATIENT
Start: 2020-10-02 | End: 2020-10-02 | Stop reason: HOSPADM

## 2020-10-02 RX ORDER — ACETAMINOPHEN 325 MG/1
650 TABLET ORAL ONCE
Status: DISCONTINUED | OUTPATIENT
Start: 2020-10-02 | End: 2020-10-02 | Stop reason: SDUPTHER

## 2020-10-02 RX ORDER — NALOXONE HYDROCHLORIDE 0.4 MG/ML
0.4 INJECTION, SOLUTION INTRAMUSCULAR; INTRAVENOUS; SUBCUTANEOUS AS NEEDED
Status: DISCONTINUED | OUTPATIENT
Start: 2020-10-02 | End: 2020-10-03 | Stop reason: HOSPADM

## 2020-10-02 RX ORDER — DIPHENHYDRAMINE HYDROCHLORIDE 50 MG/ML
12.5 INJECTION, SOLUTION INTRAMUSCULAR; INTRAVENOUS
Status: DISCONTINUED | OUTPATIENT
Start: 2020-10-02 | End: 2020-10-03 | Stop reason: HOSPADM

## 2020-10-02 RX ORDER — SODIUM CHLORIDE, SODIUM LACTATE, POTASSIUM CHLORIDE, CALCIUM CHLORIDE 600; 310; 30; 20 MG/100ML; MG/100ML; MG/100ML; MG/100ML
100 INJECTION, SOLUTION INTRAVENOUS CONTINUOUS
Status: DISCONTINUED | OUTPATIENT
Start: 2020-10-02 | End: 2020-10-02 | Stop reason: HOSPADM

## 2020-10-02 RX ORDER — GLYCOPYRROLATE 0.2 MG/ML
0.2 INJECTION INTRAMUSCULAR; INTRAVENOUS ONCE
Status: COMPLETED | OUTPATIENT
Start: 2020-10-02 | End: 2020-10-02

## 2020-10-02 RX ORDER — CEFAZOLIN SODIUM/WATER 2 G/20 ML
2 SYRINGE (ML) INTRAVENOUS EVERY 8 HOURS
Status: COMPLETED | OUTPATIENT
Start: 2020-10-02 | End: 2020-10-02

## 2020-10-02 RX ORDER — CELECOXIB 200 MG/1
200 CAPSULE ORAL ONCE
Status: COMPLETED | OUTPATIENT
Start: 2020-10-02 | End: 2020-10-02

## 2020-10-02 RX ORDER — DIPHENHYDRAMINE HYDROCHLORIDE 50 MG/ML
12.5 INJECTION, SOLUTION INTRAMUSCULAR; INTRAVENOUS AS NEEDED
Status: DISCONTINUED | OUTPATIENT
Start: 2020-10-02 | End: 2020-10-02 | Stop reason: HOSPADM

## 2020-10-02 RX ORDER — FACIAL-BODY WIPES
10 EACH TOPICAL DAILY PRN
Status: DISCONTINUED | OUTPATIENT
Start: 2020-10-04 | End: 2020-10-03 | Stop reason: HOSPADM

## 2020-10-02 RX ORDER — SODIUM CHLORIDE 0.9 % (FLUSH) 0.9 %
5-40 SYRINGE (ML) INJECTION AS NEEDED
Status: DISCONTINUED | OUTPATIENT
Start: 2020-10-02 | End: 2020-10-03 | Stop reason: HOSPADM

## 2020-10-02 RX ORDER — CELECOXIB 200 MG/1
200 CAPSULE ORAL 2 TIMES DAILY
Status: DISCONTINUED | OUTPATIENT
Start: 2020-10-02 | End: 2020-10-03 | Stop reason: HOSPADM

## 2020-10-02 RX ORDER — FENTANYL CITRATE 50 UG/ML
50 INJECTION, SOLUTION INTRAMUSCULAR; INTRAVENOUS AS NEEDED
Status: DISCONTINUED | OUTPATIENT
Start: 2020-10-02 | End: 2020-10-02 | Stop reason: HOSPADM

## 2020-10-02 RX ORDER — ONDANSETRON 2 MG/ML
4 INJECTION INTRAMUSCULAR; INTRAVENOUS
Status: DISCONTINUED | OUTPATIENT
Start: 2020-10-02 | End: 2020-10-03 | Stop reason: HOSPADM

## 2020-10-02 RX ORDER — POLYETHYLENE GLYCOL 3350 17 G/17G
17 POWDER, FOR SOLUTION ORAL DAILY
Status: DISCONTINUED | OUTPATIENT
Start: 2020-10-03 | End: 2020-10-03 | Stop reason: HOSPADM

## 2020-10-02 RX ORDER — ROPIVACAINE HYDROCHLORIDE 5 MG/ML
INJECTION, SOLUTION EPIDURAL; INFILTRATION; PERINEURAL
Status: COMPLETED | OUTPATIENT
Start: 2020-10-02 | End: 2020-10-02

## 2020-10-02 RX ORDER — AMOXICILLIN 250 MG
1 CAPSULE ORAL 2 TIMES DAILY
Status: DISCONTINUED | OUTPATIENT
Start: 2020-10-02 | End: 2020-10-03 | Stop reason: HOSPADM

## 2020-10-02 RX ORDER — CEFAZOLIN SODIUM/WATER 2 G/20 ML
2 SYRINGE (ML) INTRAVENOUS ONCE
Status: COMPLETED | OUTPATIENT
Start: 2020-10-02 | End: 2020-10-02

## 2020-10-02 RX ORDER — SODIUM CHLORIDE 9 MG/ML
25 INJECTION, SOLUTION INTRAVENOUS CONTINUOUS
Status: DISCONTINUED | OUTPATIENT
Start: 2020-10-02 | End: 2020-10-02 | Stop reason: HOSPADM

## 2020-10-02 RX ORDER — PROPOFOL 10 MG/ML
INJECTION, EMULSION INTRAVENOUS
Status: DISCONTINUED | OUTPATIENT
Start: 2020-10-02 | End: 2020-10-02 | Stop reason: HOSPADM

## 2020-10-02 RX ORDER — EPHEDRINE SULFATE/0.9% NACL/PF 50 MG/5 ML
SYRINGE (ML) INTRAVENOUS
Status: COMPLETED
Start: 2020-10-02 | End: 2020-10-02

## 2020-10-02 RX ORDER — HYDROMORPHONE HYDROCHLORIDE 1 MG/ML
0.2 INJECTION, SOLUTION INTRAMUSCULAR; INTRAVENOUS; SUBCUTANEOUS
Status: DISCONTINUED | OUTPATIENT
Start: 2020-10-02 | End: 2020-10-02 | Stop reason: HOSPADM

## 2020-10-02 RX ORDER — LIDOCAINE HYDROCHLORIDE 10 MG/ML
0.1 INJECTION, SOLUTION EPIDURAL; INFILTRATION; INTRACAUDAL; PERINEURAL AS NEEDED
Status: DISCONTINUED | OUTPATIENT
Start: 2020-10-02 | End: 2020-10-02 | Stop reason: HOSPADM

## 2020-10-02 RX ORDER — SODIUM CHLORIDE, SODIUM LACTATE, POTASSIUM CHLORIDE, CALCIUM CHLORIDE 600; 310; 30; 20 MG/100ML; MG/100ML; MG/100ML; MG/100ML
25 INJECTION, SOLUTION INTRAVENOUS CONTINUOUS
Status: DISCONTINUED | OUTPATIENT
Start: 2020-10-02 | End: 2020-10-02 | Stop reason: HOSPADM

## 2020-10-02 RX ORDER — GLYCOPYRROLATE 0.2 MG/ML
INJECTION INTRAMUSCULAR; INTRAVENOUS
Status: DISPENSED
Start: 2020-10-02 | End: 2020-10-02

## 2020-10-02 RX ORDER — MIDAZOLAM HYDROCHLORIDE 1 MG/ML
0.5 INJECTION, SOLUTION INTRAMUSCULAR; INTRAVENOUS
Status: DISCONTINUED | OUTPATIENT
Start: 2020-10-02 | End: 2020-10-02 | Stop reason: HOSPADM

## 2020-10-02 RX ORDER — GLYCOPYRROLATE 0.2 MG/ML
INJECTION INTRAMUSCULAR; INTRAVENOUS
Status: COMPLETED
Start: 2020-10-02 | End: 2020-10-02

## 2020-10-02 RX ORDER — SODIUM CHLORIDE, SODIUM LACTATE, POTASSIUM CHLORIDE, CALCIUM CHLORIDE 600; 310; 30; 20 MG/100ML; MG/100ML; MG/100ML; MG/100ML
INJECTION, SOLUTION INTRAVENOUS
Status: DISCONTINUED | OUTPATIENT
Start: 2020-10-02 | End: 2020-10-02 | Stop reason: HOSPADM

## 2020-10-02 RX ORDER — HYDROMORPHONE HYDROCHLORIDE 1 MG/ML
0.5 INJECTION, SOLUTION INTRAMUSCULAR; INTRAVENOUS; SUBCUTANEOUS
Status: DISCONTINUED | OUTPATIENT
Start: 2020-10-02 | End: 2020-10-03 | Stop reason: HOSPADM

## 2020-10-02 RX ORDER — ACETAMINOPHEN 500 MG
1000 TABLET ORAL EVERY 6 HOURS
Status: DISCONTINUED | OUTPATIENT
Start: 2020-10-02 | End: 2020-10-03 | Stop reason: HOSPADM

## 2020-10-02 RX ORDER — SODIUM CHLORIDE 9 MG/ML
125 INJECTION, SOLUTION INTRAVENOUS CONTINUOUS
Status: DISPENSED | OUTPATIENT
Start: 2020-10-02 | End: 2020-10-03

## 2020-10-02 RX ORDER — FENTANYL CITRATE 50 UG/ML
25 INJECTION, SOLUTION INTRAMUSCULAR; INTRAVENOUS
Status: DISCONTINUED | OUTPATIENT
Start: 2020-10-02 | End: 2020-10-02 | Stop reason: HOSPADM

## 2020-10-02 RX ORDER — ACETAMINOPHEN 500 MG
1000 TABLET ORAL ONCE
Status: COMPLETED | OUTPATIENT
Start: 2020-10-02 | End: 2020-10-02

## 2020-10-02 RX ADMIN — OXYCODONE 5 MG: 5 TABLET ORAL at 16:57

## 2020-10-02 RX ADMIN — OXYCODONE 10 MG: 5 TABLET ORAL at 21:13

## 2020-10-02 RX ADMIN — DEXMEDETOMIDINE HYDROCHLORIDE 4 MCG: 100 INJECTION, SOLUTION, CONCENTRATE INTRAVENOUS at 07:47

## 2020-10-02 RX ADMIN — FENTANYL CITRATE 100 MCG: 50 INJECTION, SOLUTION INTRAMUSCULAR; INTRAVENOUS at 06:59

## 2020-10-02 RX ADMIN — BUPIVACAINE HYDROCHLORIDE 10 MG: 5 INJECTION, SOLUTION EPIDURAL; INTRACAUDAL; PERINEURAL at 07:33

## 2020-10-02 RX ADMIN — DOCUSATE SODIUM 50MG AND SENNOSIDES 8.6MG 1 TABLET: 8.6; 5 TABLET, FILM COATED ORAL at 18:07

## 2020-10-02 RX ADMIN — DEXMEDETOMIDINE HYDROCHLORIDE 4 MCG: 100 INJECTION, SOLUTION, CONCENTRATE INTRAVENOUS at 07:48

## 2020-10-02 RX ADMIN — FAMOTIDINE 20 MG: 20 TABLET ORAL at 18:07

## 2020-10-02 RX ADMIN — SODIUM CHLORIDE, POTASSIUM CHLORIDE, SODIUM LACTATE AND CALCIUM CHLORIDE: 600; 310; 30; 20 INJECTION, SOLUTION INTRAVENOUS at 08:53

## 2020-10-02 RX ADMIN — CELECOXIB 200 MG: 200 CAPSULE ORAL at 18:08

## 2020-10-02 RX ADMIN — CEFAZOLIN SODIUM 2 G: 300 INJECTION, POWDER, LYOPHILIZED, FOR SOLUTION INTRAVENOUS at 16:29

## 2020-10-02 RX ADMIN — TRANEXAMIC ACID 1 G: 1 INJECTION, SOLUTION INTRAVENOUS at 07:44

## 2020-10-02 RX ADMIN — ROPIVACAINE HYDROCHLORIDE 20 ML: 5 INJECTION, SOLUTION EPIDURAL; INFILTRATION; PERINEURAL at 07:11

## 2020-10-02 RX ADMIN — SODIUM CHLORIDE, SODIUM LACTATE, POTASSIUM CHLORIDE, AND CALCIUM CHLORIDE 25 ML/HR: 600; 310; 30; 20 INJECTION, SOLUTION INTRAVENOUS at 06:39

## 2020-10-02 RX ADMIN — DEXMEDETOMIDINE HYDROCHLORIDE 4 MCG: 100 INJECTION, SOLUTION, CONCENTRATE INTRAVENOUS at 07:54

## 2020-10-02 RX ADMIN — CELECOXIB 200 MG: 200 CAPSULE ORAL at 06:29

## 2020-10-02 RX ADMIN — PROPOFOL 75 MCG/KG/MIN: 10 INJECTION, EMULSION INTRAVENOUS at 07:29

## 2020-10-02 RX ADMIN — OXYCODONE 5 MG: 5 TABLET ORAL at 18:07

## 2020-10-02 RX ADMIN — ASPIRIN 325 MG: 325 TABLET, COATED ORAL at 18:07

## 2020-10-02 RX ADMIN — Medication 5 MG: at 11:26

## 2020-10-02 RX ADMIN — ACETAMINOPHEN 1000 MG: 500 TABLET ORAL at 06:29

## 2020-10-02 RX ADMIN — Medication 2 G: at 07:41

## 2020-10-02 RX ADMIN — GLYCOPYRROLATE 0.4 MG: 0.2 INJECTION INTRAMUSCULAR; INTRAVENOUS at 11:32

## 2020-10-02 RX ADMIN — CEFAZOLIN SODIUM 2 G: 300 INJECTION, POWDER, LYOPHILIZED, FOR SOLUTION INTRAVENOUS at 23:28

## 2020-10-02 RX ADMIN — ACETAMINOPHEN 1000 MG: 500 TABLET ORAL at 21:13

## 2020-10-02 RX ADMIN — SODIUM CHLORIDE 125 ML/HR: 900 INJECTION, SOLUTION INTRAVENOUS at 21:47

## 2020-10-02 RX ADMIN — GLYCOPYRROLATE 0.2 MG: 0.2 INJECTION, SOLUTION INTRAMUSCULAR; INTRAVENOUS at 11:05

## 2020-10-02 RX ADMIN — SODIUM CHLORIDE, POTASSIUM CHLORIDE, SODIUM LACTATE AND CALCIUM CHLORIDE: 600; 310; 30; 20 INJECTION, SOLUTION INTRAVENOUS at 07:29

## 2020-10-02 RX ADMIN — ACETAMINOPHEN 1000 MG: 500 TABLET ORAL at 15:16

## 2020-10-02 RX ADMIN — GLYCOPYRROLATE 0.4 MG: 0.2 INJECTION, SOLUTION INTRAMUSCULAR; INTRAVENOUS at 11:32

## 2020-10-02 RX ADMIN — SODIUM CHLORIDE 125 ML/HR: 900 INJECTION, SOLUTION INTRAVENOUS at 10:38

## 2020-10-02 NOTE — PROGRESS NOTES
Problem: Knee Replacement: Day of Surgery/Unit  Goal: Activity/Safety  Outcome: Progressing Towards Goal  Patient ambulated safely with walker and gait belt with PT. Goal: Nutrition/Diet  Outcome: Progressing Towards Goal  Patient tolerating diet. No complaints of nausea or vomiting. Goal: *Optimal pain control at patient's stated goal  Outcome: Progressing Towards Goal  RN assessing patient's pain levels every 4 hours and reassessing within an hour of intervention. Problem: Falls - Risk of  Goal: *Absence of Falls  Description: Document Ivin Knight Fall Risk and appropriate interventions in the flowsheet. Outcome: Progressing Towards Goal  Note: Fall Risk Interventions:  Mobility Interventions: Communicate number of staff needed for ambulation/transfer, Patient to call before getting OOB    Medication Interventions: Evaluate medications/consider consulting pharmacy, Patient to call before getting OOB, Teach patient to arise slowly    Elimination Interventions: Call light in reach, Patient to call for help with toileting needs, Toileting schedule/hourly rounds    Problem: Pain  Goal: *Control of Pain  Outcome: Progressing Towards Goal  RN assessing patient's pain levels every 4 hours and reassessing within an hour of intervention.

## 2020-10-02 NOTE — ANESTHESIA PREPROCEDURE EVALUATION
Relevant Problems   No relevant active problems       Anesthetic History   No history of anesthetic complications            Review of Systems / Medical History  Patient summary reviewed, nursing notes reviewed and pertinent labs reviewed    Pulmonary  Within defined limits                 Neuro/Psych   Within defined limits           Cardiovascular  Within defined limits                Exercise tolerance: >4 METS     GI/Hepatic/Renal  Within defined limits              Endo/Other        Obesity and arthritis     Other Findings              Physical Exam    Airway  Mallampati: II  TM Distance: > 6 cm  Neck ROM: normal range of motion   Mouth opening: Normal     Cardiovascular    Rhythm: regular  Rate: normal         Dental    Dentition: Upper dentition intact and Lower dentition intact     Pulmonary  Breath sounds clear to auscultation               Abdominal  GI exam deferred       Other Findings            Anesthetic Plan    ASA: 2  Anesthesia type: spinal      Post-op pain plan if not by surgeon: peripheral nerve block single      Anesthetic plan and risks discussed with: Patient

## 2020-10-02 NOTE — PROGRESS NOTES
Problem: Mobility Impaired (Adult and Pediatric)  Goal: *Acute Goals and Plan of Care (Insert Text)  Description: FUNCTIONAL STATUS PRIOR TO ADMISSION: Patient was independent and active without use of DME.    HOME SUPPORT PRIOR TO ADMISSION: The patient lived with wife but did not require assist.    Physical Therapy Goals  Initiated 10/2/2020    1. Patient will move from supine to sit and sit to supine  in bed with modified independence within 4 days. 2. Patient will perform sit to stand with modified independence within 4 days. 3. Patient will ambulate with modified independence for 250 feet with the least restrictive device within 4 days. 4. Patient will ascend/descend 3 stairs with 1 handrail(s) with modified independence within 4 days. 5. Patient will perform home exercise program per protocol with modified independence within 4 days. 6. Patient will demonstrate AROM 0-90 degrees in operative joint within 4 days. Outcome: Progressing Towards Goal   PHYSICAL THERAPY EVALUATION  Patient: Sarah Jay (79 y.o. male)  Date: 10/2/2020  Primary Diagnosis: Degenerative arthritis of left knee [M17.12]  Procedure(s) (LRB):  LEFT TOTAL KNEE REPLACEMENT (SPINAL W/MAC W/BLOCK) (REQ FLIP ROOMS) (Left) Day of Surgery   Precautions:   Fall    ASSESSMENT  Based on the objective data described below, the patient presents with decreased functional mobility from baseline level of function. Currently limited by some R knee instability secondary to anesthesia but otherwise moving well. Needing supervision for bed mobility and CGA for transfers. Able to amb approx 40 feet with RW and CGA with no overt LOB but mild knee instability but no buckling. Anticipate patient will progress quickly with mobility and may be ready for DC after AM session. Other factors to consider for discharge: none     Patient will benefit from skilled therapy intervention to address the above noted impairments.        PLAN :  Recommendations and Planned Interventions: bed mobility training, transfer training, gait training, therapeutic exercises, neuromuscular re-education, patient and family training/education, and therapeutic activities      Frequency/Duration: Patient will be followed by physical therapy:  twice daily to address goals. Recommendation for discharge: (in order for the patient to meet his/her long term goals)  Physical therapy at least 2 days/week in the home         IF patient discharges home will need the following DME: patient owns DME required for discharge         SUBJECTIVE:   Patient stated do you know if the cement is solid right away?     OBJECTIVE DATA SUMMARY:   HISTORY:    Past Medical History:   Diagnosis Date    Arthritis     Cancer ([facility] [address] 2018    SKIN CANCER     Past Surgical History:   Procedure Laterality Date    HX ORTHOPAEDIC  1981    cervical fusion    HX ORTHOPAEDIC Left 2012    SCREWS PLACED IN LEFT CLAVICLE    HX VASECTOMY         Personal factors and/or comorbidities impacting plan of care:     Home Situation  Home Environment: Private residence  # Steps to Enter: 2  Rails to Enter: Yes  Hand Rails : Right  Wheelchair Ramp: No  One/Two Story Residence: Two story  # of Interior Steps: 15  Interior Rails: Right  Lift Chair Available: No  Living Alone: No  Support Systems: Spouse/Significant Other/Partner  Patient Expects to be Discharged to[de-identified] Private residence  Current DME Used/Available at Home: Adaptive dressing aides, Cane, straight, Commode, bedside, Crutches, Raised toilet seat, Walker, rolling  Tub or Shower Type: Shower    EXAMINATION/PRESENTATION/DECISION MAKING:   Critical Behavior:  Neurologic State: Alert  Orientation Level: Oriented X4  Cognition: Appropriate decision making, Appropriate for age attention/concentration, Appropriate safety awareness, Follows commands     Hearing:   Auditory  Auditory Impairment: None    Range Of Motion:  AROM: Generally decreased, functional Strength:    Strength: Generally decreased, functional          Functional Mobility:  Bed Mobility:  Rolling: Supervision  Supine to Sit: Supervision  Sit to Supine: Supervision  Scooting: Supervision  Transfers:  Sit to Stand: Contact guard assistance  Stand to Sit: Contact guard assistance                       Balance:   Sitting: Intact  Standing: Intact  Ambulation/Gait Training:  Distance (ft): 40 Feet (ft)  Assistive Device: Gait belt;Walker, rolling  Ambulation - Level of Assistance: Contact guard assistance     Gait Description (WDL): Exceptions to WDL  Gait Abnormalities: Decreased step clearance; Step to gait        Base of Support: Widened        Step Length: Left shortened;Right shortened          Pain Rating:  No c/o pain at current    Activity Tolerance:   Good and requires rest breaks  Please refer to the flowsheet for vital signs taken during this treatment. After treatment patient left in no apparent distress:   Supine in bed and Call bell within reach    COMMUNICATION/EDUCATION:   The patients plan of care was discussed with: Physical therapist, Occupational therapist, and Registered nurse. Fall prevention education was provided and the patient/caregiver indicated understanding., Patient/family have participated as able in goal setting and plan of care. , and Patient/family agree to work toward stated goals and plan of care.     Thank you for this referral.  Brooke Andrews, PT, DPT   Time Calculation: 16 mins

## 2020-10-02 NOTE — ANESTHESIA PROCEDURE NOTES
Peripheral Block    Start time: 10/2/2020 7:07 AM  End time: 10/2/2020 7:11 AM  Performed by: Yany Ryder MD  Authorized by: Yany Ryder MD       Pre-procedure: Indications: at surgeon's request and post-op pain management    Preanesthetic Checklist: patient identified, risks and benefits discussed, site marked, timeout performed and patient being monitored      Block Type:   Block Type:   Adductor canal  Laterality:  Left  Monitoring:  Standard ASA monitoring, continuous pulse ox, frequent vital sign checks, heart rate, responsive to questions and oxygen  Injection Technique:  Single shot  Procedures: ultrasound guided    Patient Position: supine  Prep: chlorhexidine    Location:  Mid thigh  Needle Type:  Stimuplex  Needle Gauge:  22 G  Needle Localization:  Ultrasound guidance  Medication Injected:  Ropivacaine (PF) (NAROPIN)(0.5%) 5 mg/mL injection, 20 mL  Med Admin Time: 10/2/2020 7:11 AM    Assessment:  Number of attempts:  1  Injection Assessment:  Incremental injection every 5 mL, no paresthesia, no intravascular symptoms, negative aspiration for blood and local visualized surrounding nerve on ultrasound  Patient tolerance:  Patient tolerated the procedure well with no immediate complications

## 2020-10-02 NOTE — ANESTHESIA PROCEDURE NOTES
Spinal Block    Start time: 10/2/2020 7:31 AM  End time: 10/2/2020 7:33 AM  Performed by: Naa Bain MD  Authorized by: Naa Bain MD     Pre-procedure:   Indications: primary anesthetic  Preanesthetic Checklist: patient identified, risks and benefits discussed, site marked, patient being monitored and timeout performed      Spinal Block:   Patient Position:  Seated  Prep Region:  Lumbar  Prep: Betadine      Location:  L2-3  Technique:  Single shot        Needle:   Needle Type:  Pencan  Needle Gauge:  24 G  Attempts:  1      Events: CSF confirmed, no blood with aspiration and no paresthesia        Assessment:  Insertion:  Uncomplicated  Patient tolerance:  Patient tolerated the procedure well with no immediate complications

## 2020-10-02 NOTE — PERIOP NOTES
TRANSFER - OUT REPORT:    Verbal report given to Rosy Velez (name) on Comfort Jeffers.  being transferred to  (unit) for routine post - op       Report consisted of patients Situation, Background, Assessment and   Recommendations(SBAR). Time Pre op antibiotic VZUAF:0003  Anesthesia Stop time: 6615  Saucedo Present on Transfer to floor:no  Order for Saucedo on Chart:no  Discharge Prescriptions with Chart:no    Information from the following report(s) SBAR, OR Summary, Procedure Summary, Intake/Output, MAR, Recent Results and Cardiac Rhythm Sinus Naomia Fulling was reviewed with the receiving nurse. Opportunity for questions and clarification was provided. Is the patient on 02? NO    Is the patient on a monitor? NO    Is the nurse transporting with the patient? NO    Surgical Waiting Area notified of patient's transfer from PACU? YES      The following personal items collected during your admission accompanied patient upon transfer:   Dental Appliance: Dental Appliances: None  Vision: Visual Aid: Glasses  Hearing Aid:    Jewelry: Jewelry: None  Clothing: Clothing: (clothing and shoes with patient in PACU)  Other Valuables:  Other Valuables: Eyeglasses, Other (comment), With patient(bandana )  Valuables sent to safe:

## 2020-10-02 NOTE — PROGRESS NOTES
1335:  TRANSFER - IN REPORT:  Verbal report received from Sushila Fernando RN (name) on Arin Victoria.  being received from PACU (unit) for routine post - op      Report consisted of patients Situation, Background, Assessment and   Recommendations(SBAR). Information from the following report(s) SBAR, Kardex, OR Summary, Intake/Output, MAR and Recent Results was reviewed with the receiving nurse. Opportunity for questions and clarification was provided. Assessment completed upon patients arrival to unit and care assumed. 1400:  Primary Nurse Chalino Chavis and Teto Fagan RN performed a dual skin assessment on this patient. Impairment noted- Burn to R index finger from cooking and surgical incision/dressing to L knee. Lamont score is 21    2019:  Bedside shift change report given to Kwan Thomas RN (oncoming nurse) by [facility], RN (offgoing nurse). Report included the following information SBAR, Kardex, OR Summary, Intake/Output, MAR and Recent Results.

## 2020-10-02 NOTE — OP NOTES
Total Knee Operative Report      Patient: Maris Rose MRN: [id number]  SSN: [id number]    YOB: 1950  Age: 78 y.o. Sex: male      DATE OF SURGERY:  10/2/2020    Indications: This is a 78 yrs male who presents with  left knee DJD. The patient was admitted for surgery as conservative measures have failed. Date of Surgery: 10/2/2020     Preoperative Diagnosis:  LEFT KNEE PRIMARY OSTEOARTHRITIS    Postoperative Diagnosis: LEFT KNEE PRIMARY OSTEOARTHRITIS    Surgeon: Yeimi Wang MD (Jody)    Assistant: Nando Santos MD    Anesthesia: Spinal    Procedure:   LEFT TOTAL KNEE REPLACEMENT    Procedure Details:  After the procedure had been explained to the patient including the risks, benefits and possible complications, Maris Rose signed the informed consent. Maris Rose was then brought to the operating room and positioned on the operating table in a supine position and was anethestized with anesthesia. A vegas catheter was placed preoperatively and IV Ancef 2gm  was administered. A pneumatic tourniquet was placed about the limb and the left leg was prepped and draped in the usual sterile manner. The tourniquet was inflated. An anterior longitudinal incision was accomplished just medial to the tibial tubercle and extending approximal 6 centimeters proximal to the superior pole of the patella. A medial parapatellar capsular incision was performed. The medial capsular flap was elevated around to the insertion of the semimembranous tendon. The patella was everted and the knee flexed and externally rotated. The medial and lateral menisci were excised. The lateral half of the fat pad excised and the patella femoral ligament was released. The anterior cruciate ligament was resected and the posterior cruciate ligament was substituted. The Knee was flexed to 90 degrees and an intramedullary canal entry hole was drilled just anterior to the PCL insertion on the femur.  The intramedullary wagner was inserted and the cutting guide used this to reference for a resection of 10 mm off of the distal femur in approx 6 degrees of valgus. Using extramedullary instrumentation, the tibial cut was then accomplished with three degrees of posterior slope. Approxiamately 2 mm of bone was removed from the medial side of the tibia. The flexion and extension gaps were assessed. The sizing guide for the femoral component was then placed and a size 11 was chosen. The guide was set in  3 degrees external rotation to the epicondylar axis to create an equal flexion gap. The appropriate cutting blocks were then utilized to perform the anterior,  Posterior, and  Chamfer cuts  with appropriate lateral translation accomplished. The tibia was sized to a G component. The tibial base plate was pinned into place and stem site prepared. The femoral trial with the box guide was set for the posterior cruciate substituting prosthesis and these cuts were made also. Lug holes were drilled to accommodate the femoral component. A preliminary range of motion was accomplished with the above size trial components. A 10 millimeter polyethylene insert allowed the patient to obtain full extension as well as appropriate flexion. The patient's ligaments were stable in flexion and extension to medial and lateral stressing and the alignment was through the appropriate mechanical axis. The patella was then measured using the calipers and found to be 26 mm thick. Using the cutting guide, 9.5 mm were then resected to accommodate the size 35 patella three peg button. The appropriate guide was then used to drill the three pegs. All trial components were removed and the cut surfaces prepared for cementing with irrigation and debridement of the bone interstices. There were no femoral deficiencies. There were no tibial deficiencies. No augmentation was utilized.   Two package of cement were mixed and the permanent components cemented into place. The femoral and tibial components were pressurized in full extension as well as 70 degrees of flexion. The patella component was pressurized using the patella clamp. Excess cement was using a curette. A combination of Exparel, MSO4, Marcaine, Steroid and Saline was then injected into the entirety of the capsule to assist with post-op pain relief. Once the cement was hardened, the patella clamp was removed and the knee was copiously irrigated. Retrialing was done and a size 11GHL  tibial polyethylene component  of 10 mm thickness was chosen. Marita Daly. knee was placed through range of motion and noted to be stable as mentioned above with the trail components. The wound was dry, therefore no drain was used. The capsular layer was closed using a #1 ethibond suture and stratafix suture, while subcutaneous layers were closed using 2-0 Dexon interrupted sutures. Finally the skin was closed using Skin staples, which were applied in occlusive fashion and sterile bandage applied. An Iceman cryo pad was applied on the operative leg. The pneumatic tourniquet was deflated. Sponge count and needle counts were correct. Marita Daly. left the operating room and went to the PACU in Stable Condition. Velia Kapadia PA-C was of vital assistance during the performance of the procedure. She was essential for positioning the knee for the various parts of the procedure and assisting with the exposure of the knee, protection of vital structures and the closure of the knee. Tourniquet Time: * Missing tourniquet times found for documented tourniquets in lo *     Implants:   Implant Name Type Inv.  Item Serial No.  Lot No. LRB No. Used Action   Smartset MV Bone Cement, 40g Cement  NA [employer] [id number] Left 1 Implanted   Smartset MV Bone Cement, 40g Cement  NA [employer] [id number] Left 1 Implanted   COMPONENT FEM SZ 11 STD LT KNEE CO CHROM CRUC RET NIKKY - SNA  COMPONENT FEM SZ 11 STD LT KNEE CO CHROM CRUC RET NIKKY NA [employer] [id number] Left 1 Implanted   TIB PRN NP STM 5 DEG SZ GL -- PERSONA - SNA  TIB PRN NP STM 5 DEG SZ GL -- PERSONA NA [employer] [id number] Left 1 Implanted   COMPONENT PAT [id number] THK9MM STD KNEE VIVACIT-E NIKKY PERSONA - SNA  COMPONENT PAT [id number] THK9MM STD KNEE VIVACIT-E NIKKY PERSONA NA [employer] [id number] Left 1 Implanted   COMPONENT ARTC SURF MEDL AYANNA 8-11 GH 10 MM LT CR FIX BEAR - SNA  COMPONENT ARTC SURF MEDL AYANNA 8-11 GH 10 MM LT CR FIX BEAR NA [employer] [id number] Left 1 Implanted        Femur Size: 11    Tibia Size: G    Condition: Stable    Findings: DJD    Estimated Blood Loss:    100 cc         Drains: None    Specimens: * No specimens in log *      Complications:  None; patient tolerated the procedure well    Signed By: Yeimi Dixon MD (10/2/2020 at 9:48 AM)

## 2020-10-02 NOTE — DISCHARGE INSTRUCTIONS
After Hospital Care Plan:  Discharge Instructions Knee Replacement-Dr. Adriana Burns      Patient Name: Maximino Bull. Date of procedure: 10/2/2020   Procedure: Procedure(s):  LEFT TOTAL KNEE REPLACEMENT (SPINAL W/MAC W/BLOCK) (REQ FLIP ROOMS)  Surgeon: Javid Stafford and Role:     Yeimi Ledezma MD (Jody) - Primary     * Adelso Franco MD - Fellow    PCP: La Nena Cuevas MD  Date of discharge: No discharge date for patient encounter. Diet  Regular diet or usual diet as at home. Drink plenty of fluids. Eat foods high in fiber and protein and low in fat. Avoid alcoholic beverages. Avoid smoking. Activities  You are going home a well person, so be as active as possible. Walk with your walker or crutches weight bearing as tolerated-wean as tolerated. Avoid low chairs and slippery surfaces. Avoid twisting your knee. Do not sit longer than 45 minutes at a time. During the daytime, get up every hour and take a brief walk. Exercises  Perform your exercise routine 3 times a day as instructed by the physical therapist.  Gradually increase the repetitions of the exercises. You may place an ice bag on your knee for 15-20 minutes after exercising. You should walk daily, each time lengthening your walking distance as your strength improves. Be sure to work on getting your knee out all the way straight. Do not place a pillow under your knee when resting. Medications  Take  Aspirin 325mg 1 tablet twice a day for 4 weeks. Take a multivitamin with iron once a day for a month. Take a stool softener daily (such as Senokot-S or Colace) to prevent constipation while you are taking iron and pain medication. If constipation occurs, take a laxative (such as Dulcolax tablets, Milk of Magnesia, or suppository). Take pain medication with food. You will find that you will decrease the amount you use as your pain lessens. Oxycodone 5 mg tabs, 1-2 every 3-4 hours as needed for pain.  It is OK to take tylenol and Ibuprofen with your oxycodone to help control pain. Incision Care  You will have a waterproof dressing on the knee called Aquacel and it is OK to shower with the dressing in place. The Aquacel dressing will be removed 1 week after surgery. Ok to leave open to the air as long as it is not draining      If you have staples ; they will be removed 2 weeks after surgery by  your home health nurse  If there are steri-strips ; please leave them in place until they fall off. Cover your wound if you are having any drainage. Wear your elastic stockings for 4 weeks. You may remove them for approximately 1 hour when showering       Follow-up Office Visit  Post op appointment is with Velia Kapadia PA-C ( Dr. Jelani ARMENTA) on 10/19/2020 at 1:50pm with Corrine Cervantes. Please call the office if you need to change your follow -up appointment ([phone])    Reasons to call the Doctor  1. Increased redness, swelling or drainage from you incision site. 2.  Temperature consistently greater than 101 degrees. 3.  Increased pain or unrelieved pain. 4.  Calf or chest pain      Home Health/Home therapy  Physical Therapy-routine exercises, ROM,  gait training quad strengthening. 3 times in 10 day period   Remove Aquacel Dressing 1 week post op. (leave steri-strips in place iuntil they come off. Out Patient Physical therapy  You should expect to begin outpatient physical therapy approximately 2 -2 1/2 weeks after surgery. This should already be set up prior to your surgery. If it is not already scheduled, please call the office and speak with one of my assistants about setting up outpatient physical therapy. Other instructions  Do not take more than 4 Grams of Tylenol in 24 hours. Many pain medications contain Tylenol. Percocet contains 325mg of Tylenol per tablet; Lortab contains 500mg of Tylenol per tablet, Norco contains 325 mg of Tylenol per tablet.     Tylenol usage:  325 mg tablets DO NOT take more than 12 tablets in 24 hours                            500mg tablets DO NOT take more than 8 tablets in 24 hours

## 2020-10-02 NOTE — ANESTHESIA POSTPROCEDURE EVALUATION
Post-Anesthesia Evaluation and Assessment    Patient: Beverley Newman. MRN: [id number]  SSN: [id number]    YOB: 1950  Age: 78 y.o. Sex: male      I have evaluated the patient and they are stable and ready for discharge from the PACU. Cardiovascular Function/Vital Signs  Visit Vitals  /66   Pulse (!) 57   Temp (!) 35.8 °C (96.5 °F)   Resp 18   Ht 6' (1.829 m)   Wt 107 kg (235 lb 14.3 oz)   SpO2 94%   BMI 31.99 kg/m²       Patient is status post Spinal anesthesia for Procedure(s):  LEFT TOTAL KNEE REPLACEMENT (SPINAL W/MAC W/BLOCK) (REQ FLIP ROOMS). Nausea/Vomiting: None    Postoperative hydration reviewed and adequate. Pain:  Pain Scale 1: Numeric (0 - 10) (10/02/20 1007)  Pain Intensity 1: 0 (10/02/20 1007)   Managed    Neurological Status:   Neuro (WDL): Exceptions to WDL (10/02/20 1007)  Neuro  Neurologic State: Drowsy (10/02/20 1007)  Orientation Level: Oriented to person;Oriented to place;Oriented to situation (10/02/20 1007)  Cognition: Follows commands (10/02/20 1007)  Speech: Clear (10/02/20 1007)  LUE Motor Response: Purposeful (10/02/20 1007)  LLE Motor Response: Numbness (10/02/20 1007)  RUE Motor Response: Purposeful (10/02/20 1007)  RLE Motor Response: Numbness (10/02/20 1007)   Block resolving    Mental Status, Level of Consciousness: Alert and oriented     Pulmonary Status:   O2 Device: Nasal cannula (10/02/20 1007)   Adequate oxygenation and airway patent    Complications related to anesthesia: None    Post-anesthesia assessment completed. No concerns    Signed By: Mary Marquez MD     October 2, 2020              Procedure(s):  LEFT TOTAL KNEE REPLACEMENT (SPINAL W/MAC W/BLOCK) (REQ FLIP ROOMS).     spinal    <BSHSIANPOST>    INITIAL Post-op Vital signs:   Vitals Value Taken Time   /66 10/2/2020 10:30 AM   Temp 35.8 °C (96.5 °F) 10/2/2020 10:07 AM   Pulse 48 10/2/2020 10:43 AM   Resp 14 10/2/2020 10:43 AM   SpO2 94 % 10/2/2020 10:43 AM   Vitals shown include unvalidated device data.

## 2020-10-03 VITALS
RESPIRATION RATE: 16 BRPM | TEMPERATURE: 98 F | OXYGEN SATURATION: 94 % | SYSTOLIC BLOOD PRESSURE: 126 MMHG | BODY MASS INDEX: 31.95 KG/M2 | WEIGHT: 235.89 LBS | HEIGHT: 72 IN | HEART RATE: 57 BPM | DIASTOLIC BLOOD PRESSURE: 74 MMHG

## 2020-10-03 PROCEDURE — 74011250636 HC RX REV CODE- 250/636: Performed by: ORTHOPAEDIC SURGERY

## 2020-10-03 PROCEDURE — 90471 IMMUNIZATION ADMIN: CPT

## 2020-10-03 PROCEDURE — 74011250637 HC RX REV CODE- 250/637: Performed by: ORTHOPAEDIC SURGERY

## 2020-10-03 PROCEDURE — 99218 HC RM OBSERVATION: CPT

## 2020-10-03 PROCEDURE — 97116 GAIT TRAINING THERAPY: CPT

## 2020-10-03 PROCEDURE — 97535 SELF CARE MNGMENT TRAINING: CPT

## 2020-10-03 PROCEDURE — 97165 OT EVAL LOW COMPLEX 30 MIN: CPT

## 2020-10-03 PROCEDURE — 90686 IIV4 VACC NO PRSV 0.5 ML IM: CPT | Performed by: ORTHOPAEDIC SURGERY

## 2020-10-03 RX ORDER — ASPIRIN 325 MG
325 TABLET, DELAYED RELEASE (ENTERIC COATED) ORAL 2 TIMES DAILY
Qty: 50 TAB | Refills: 2 | Status: SHIPPED
Start: 2020-10-03

## 2020-10-03 RX ORDER — OXYCODONE HYDROCHLORIDE 5 MG/1
5 TABLET ORAL
Qty: 42 TAB | Refills: 0 | Status: SHIPPED | OUTPATIENT
Start: 2020-10-03 | End: 2020-10-10

## 2020-10-03 RX ADMIN — OXYCODONE 10 MG: 5 TABLET ORAL at 05:32

## 2020-10-03 RX ADMIN — ASPIRIN 325 MG: 325 TABLET, COATED ORAL at 08:45

## 2020-10-03 RX ADMIN — OXYCODONE 10 MG: 5 TABLET ORAL at 01:40

## 2020-10-03 RX ADMIN — FAMOTIDINE 20 MG: 20 TABLET ORAL at 08:45

## 2020-10-03 RX ADMIN — OXYCODONE 10 MG: 5 TABLET ORAL at 08:45

## 2020-10-03 RX ADMIN — CELECOXIB 200 MG: 200 CAPSULE ORAL at 08:45

## 2020-10-03 RX ADMIN — POLYETHYLENE GLYCOL 3350 17 G: 17 POWDER, FOR SOLUTION ORAL at 08:44

## 2020-10-03 RX ADMIN — OXYCODONE 10 MG: 5 TABLET ORAL at 11:52

## 2020-10-03 RX ADMIN — INFLUENZA VIRUS VACCINE 0.5 ML: 15; 15; 15; 15 SUSPENSION INTRAMUSCULAR at 12:52

## 2020-10-03 RX ADMIN — DOCUSATE SODIUM 50MG AND SENNOSIDES 8.6MG 1 TABLET: 8.6; 5 TABLET, FILM COATED ORAL at 08:44

## 2020-10-03 RX ADMIN — ACETAMINOPHEN 1000 MG: 500 TABLET ORAL at 07:16

## 2020-10-03 RX ADMIN — ACETAMINOPHEN 1000 MG: 500 TABLET ORAL at 01:40

## 2020-10-03 NOTE — PROGRESS NOTES
Reviewed and provided discharge instructions to the patient and his wife. Answered any questions, patient watched the joint replacement discharge video.

## 2020-10-03 NOTE — PROGRESS NOTES
Problem: Mobility Impaired (Adult and Pediatric)  Goal: *Acute Goals and Plan of Care (Insert Text)  Description: FUNCTIONAL STATUS PRIOR TO ADMISSION: Patient was independent and active without use of DME.    HOME SUPPORT PRIOR TO ADMISSION: The patient lived with wife but did not require assist.    Physical Therapy Goals  Initiated 10/2/2020    1. Patient will move from supine to sit and sit to supine  in bed with modified independence within 4 days. 2. Patient will perform sit to stand with modified independence within 4 days. 3. Patient will ambulate with modified independence for 250 feet with the least restrictive device within 4 days. 4. Patient will ascend/descend 3 stairs with 1 handrail(s) with modified independence within 4 days. 5. Patient will perform home exercise program per protocol with modified independence within 4 days. 6. Patient will demonstrate AROM 0-90 degrees in operative joint within 4 days. Outcome: Progressing Towards Goal   PHYSICAL THERAPY TREATMENT  Patient: Darlin Ashley (79 y.o. male)  Date: 10/3/2020  Diagnosis: Degenerative arthritis of left knee [M17.12]   <principal problem not specified>  Procedure(s) (LRB):  LEFT TOTAL KNEE REPLACEMENT (SPINAL W/MAC W/BLOCK) (REQ FLIP ROOMS) (Left) 1 Day Post-Op  Precautions: Fall  Chart, physical therapy assessment, plan of care and goals were reviewed. ASSESSMENT  Patient continues with skilled PT services and is progressing towards goals. He demonstrates the ability to ambulate increased distance with use of RW and SBA. Patient demonstrates decreased L LE weight bearing and step length. Ace Wrap is still on and patient demonstrates increased L LE swelling with stiff gait. Reinforced the use of ice and elevation for reduced swelling and improved edema. Patient ascends and descends 12 stairs with step to gait pattern and right railing. He uses a cane  in the left hand.  He demonstrates good stability and balance despite leading with \"good leg\" down on step. Patient is cleared by PT at this time. Current Level of Function Impacting Discharge (mobility/balance): SBA with RW, CGA with stairs     Other factors to consider for discharge: medical stability         PLAN :  Patient continues to benefit from skilled intervention to address the above impairments. Continue treatment per established plan of care. to address goals. Recommendation for discharge: (in order for the patient to meet his/her long term goals)  Physical therapy at least 2 days/week in the home     This discharge recommendation:  Has been made in collaboration with the attending provider and/or case management    IF patient discharges home will need the following DME: patient owns DME required for discharge       SUBJECTIVE:   Patient stated O yeah we can get a cane.     OBJECTIVE DATA SUMMARY:   Critical Behavior:  Neurologic State: Alert  Orientation Level: Oriented X4  Cognition: Appropriate decision making, Appropriate for age attention/concentration, Appropriate safety awareness, Follows commands     Functional Mobility Training:  Bed Mobility:  Rolling: Supervision  Supine to Sit: Supervision  Sit to Supine: Supervision  Scooting: Supervision        Transfers:  Sit to Stand: Stand-by assistance  Stand to Sit: Stand-by assistance                             Balance:  Sitting: Intact  Standing: Intact  Ambulation/Gait Training:  Distance (ft): 150 Feet (ft)  Assistive Device: Gait belt;Walker, rolling  Ambulation - Level of Assistance: Contact guard assistance        Gait Abnormalities: Decreased step clearance; Step to gait; Antalgic        Base of Support: Widened        Step Length: Left shortened;Right shortened                    Stairs:  Number of Stairs Trained: 12  Stairs - Level of Assistance: Contact guard assistance   Rail Use: Right     Therapeutic Exercises:     Pain Rating:      Activity Tolerance:   Good  Please refer to the flowsheet for vital signs taken during this treatment. After treatment patient left in no apparent distress:   Sitting in chair and Call bell within reach    COMMUNICATION/COLLABORATION:   The patients plan of care was discussed with: Registered nurse.      Karmen Olsen, PT   Time Calculation: 26 mins

## 2020-10-03 NOTE — PROGRESS NOTES
OCCUPATIONAL THERAPY EVALUATION/DISCHARGE  Patient: Ileana Anderson (79 y.o. male)  Date: 10/3/2020  Primary Diagnosis: Degenerative arthritis of left knee [M17.12]  Procedure(s) (LRB):  LEFT TOTAL KNEE REPLACEMENT (SPINAL W/MAC W/BLOCK) (REQ FLIP ROOMS) (Left) 1 Day Post-Op   Precautions:   Fall    ASSESSMENT  Based on the objective data described below, the patient presents with joint stiffness, decreased ADl related mobility and performance as anticipated s/p L TKA POD 1. Pt participated in training/education for safety recommendations for ADLs/iADLs to optimize recovery in home/community. Educated on long handled DME/AE to increase independence if needed, however demoed the ability to reach B feet from sitting in chair without difficulty. Demosntrated good understanding of functional implications of surgery as indicated by questions regarding safety during ADLs/iADls. Anticipate pt will make good progress at home/in community. Current Level of Function (ADLs/self-care): up to supervision/sba with ADLs (if needed)    Functional Outcome Measure: The patient scored 75/100 on the Barthel Index outcome measure. Other factors to consider for discharge: wife at home can assist as needed     PLAN :  Recommendation for discharge: (in order for the patient to meet his/her long term goals)  No skilled occupational therapy/ follow up rehabilitation needs identified at this time. This discharge recommendation:  Has not yet been discussed the attending provider and/or case management    IF patient discharges home will need the following DME: AE: long handled bathing and shower chair       SUBJECTIVE:   Patient stated Its good for me to practice these things.     OBJECTIVE DATA SUMMARY:   HISTORY:   Past Medical History:   Diagnosis Date    Arthritis     Cancer ([facility] [address] 2018    SKIN CANCER     Past Surgical History:   Procedure Laterality Date    HX ORTHOPAEDIC  1981    cervical fusion    HX ORTHOPAEDIC Left 2012    SCREWS PLACED IN LEFT CLAVICLE    HX VASECTOMY         Prior Level of Function/Environment/Context: MOD I/I for ADLs and ADL related mobility  Expanded or extensive additional review of patient history:     Home Situation  Home Environment: Private residence  # Steps to Enter: 2  Rails to Enter: Yes  Hand Rails : Right  Wheelchair Ramp: No  One/Two Story Residence: Two story  # of Interior Steps: 15  Interior Rails: Right  Lift Chair Available: No  Living Alone: No  Support Systems: Spouse/Significant Other/Partner  Patient Expects to be Discharged to[de-identified] Private residence  Current DME Used/Available at Home: Adaptive dressing aides, Cane, straight, Commode, bedside, Crutches, Raised toilet seat, Walker, rolling  Tub or Shower Type: Shower    Hand dominance: Right    EXAMINATION OF PERFORMANCE DEFICITS:  Cognitive/Behavioral Status:  Neurologic State: Alert  Orientation Level: Oriented X4  Cognition: Appropriate decision making; Appropriate for age attention/concentration; Appropriate safety awareness; Follows commands             Skin: no issues observed, see nursing notes for details    Edema: none observed. See nursing notes for details    Hearing: Auditory  Auditory Impairment: None    Vision/Perceptual:       Corrective lenses                              Range of Motion:    AROM: Generally decreased, functional                         Strength:    Strength: Generally decreased, functional                Coordination:     Fine Motor Skills-Upper: Left Intact; Right Intact    Gross Motor Skills-Upper: Right Intact; Left Intact    Tone & Sensation:                              Balance:  Sitting: Intact  Standing: Intact    Functional Mobility and Transfers for ADLs:  Bed Mobility:  Rolling: Supervision  Supine to Sit: Supervision  Sit to Supine: Supervision  Scooting: Supervision    Transfers:  Sit to Stand: Stand-by assistance  Stand to Sit: Stand-by assistance    ADL Assessment:  Feeding: Independent    Oral Facial Hygiene/Grooming: Stand-by assistance    Bathing: Stand-by assistance    Upper Body Dressing: Setup    Lower Body Dressing: Stand-by assistance    Toileting: Stand by assistance                ADL Intervention and task modifications:                           Lower Body Dressing Assistance  Socks: Modified independent              Bathing: Patient instructed and indicated understanding when bathing to not submerge wound in water, stand to shower or sponge bathe, cover wound with plastic and tape to ensure no water reaches bandage/wound without cues. Dressing joint: Patient instructed and demonstrated understanding to don/doff Left LE first/last with Setup. Patient instructed and demonstrated to don all clothing while sitting prior to standing, doff all clothing to knees while standing, then sit to doff clothing off from knees to feet in order to facilitate fall prevention, pain management, and energy conservation with Supervision. Dressing joint reach exercise: To increase independence with lower body dressing, patient instructed and demonstrated to reach down Left LE in a seated position slowly to prevent tearing/shearing until slight pull is felt, hold at end range for 10 seconds, then return to starting upright position with Supervision. Home safety: Patient instructed and indicated understanding on home modifications and safety (raise height of ADL objects, appropriate height of chair surfaces, recliner safety, change of floor surfaces, clear pathways) to increase independence and fall prevention. Standing:  Patient educated about knee anatomy and educated to avoid rotation of Left LE. Instructed to apply concept to ADLs within the home (no twisting of knee during reaching across body, square off while using objects, slide objects along surfaces).   Patient instructed and indicated understanding to increase amount of time standing, observe standing position during ADLs in order to increase even weight bearing through bilateral LEs in order to increase independence with ADLs. Functional Measure:  Barthel Index:    Bathin  Bladder: 10  Bowels: 10  Groomin  Dressing: 10  Feeding: 10  Mobility: 10  Stairs: 5  Toilet Use: 5  Transfer (Bed to Chair and Back): 10  Total: 75/100        The Barthel ADL Index: Guidelines  1. The index should be used as a record of what a patient does, not as a record of what a patient could do. 2. The main aim is to establish degree of independence from any help, physical or verbal, however minor and for whatever reason. 3. The need for supervision renders the patient not independent. 4. A patient's performance should be established using the best available evidence. Asking the patient, friends/relatives and nurses are the usual sources, but direct observation and common sense are also important. However direct testing is not needed. 5. Usually the patient's performance over the preceding 24-48 hours is important, but occasionally longer periods will be relevant. 6. Middle categories imply that the patient supplies over 50 per cent of the effort. 7. Use of aids to be independent is allowed. Vaughn Taylor., Barthel, D.W. (1276). Functional evaluation: the Barthel Index. [address] [facility] (14)2. LOGAN Headley, Azalea Barrera.Laila, Van, [address] [place] [address] Ave (). Measuring the change indisability after inpatient rehabilitation; comparison of the responsiveness of the Barthel Index and Functional Haddock Measure. Journal of Neurology, Neurosurgery, and Psychiatry, 66(4), 310-202. NAT Enrique.LO, DEEPA Desir, & Shelton Fothergill, M.A. (2004.) Assessment of post-stroke quality of life in cost-effectiveness studies: The usefulness of the Barthel Index and the EuroQoL-5D.  Quality of Life Research, 15, 999-41         Occupational Therapy Evaluation Charge Determination   History Examination Decision-Making   LOW Complexity : Brief history review MEDIUM Complexity : 3-5 performance deficits relating to physical, cognitive , or psychosocial skils that result in activity limitations and / or participation restrictions MEDIUM Complexity : Patient may present with comorbidities that affect occupational performnce. Miniml to moderate modification of tasks or assistance (eg, physical or verbal ) with assesment(s) is necessary to enable patient to complete evaluation       Based on the above components, the patient evaluation is determined to be of the following complexity level: MEDIUM  Pain Rating:  Not rated/reported    Activity Tolerance:   Good  Please refer to the flowsheet for vital signs taken during this treatment. After treatment patient left in no apparent distress:    Sitting in chair and Call bell within reach    COMMUNICATION/EDUCATION:   The patients plan of care was discussed with: Physical therapist and Registered nurse.      Thank you for this referral.  Rafael Angelo OT  Time Calculation: 26 mins

## 2020-10-03 NOTE — PROGRESS NOTES
Transition of Care Plan   RUR- Low N/ A    DISPOSITION: Referral sent in [employer] [facility] for At [address] Transport: family by car  [address] Follow up: PCP/Specialist(s)           Reason for Admission:  surgery                    RUR Score:                     Plan for utilizing home health:    [place] of Choice discussed over the phone. Referral will be sent in [employer] [facility] for At [address]      PCP: First and Last name:  Madelin Marshall MD   [facility]   Are you a current patient: Yes/No: yes    Approximate date of last visit:  Sept 2020   Can you participate in a virtual visit with your PCP: yes                    Current Advanced Directive/Advance Care Plan:                        Full code/ no ACP and patient declined discussion  Transition of Care Plan: This CM spoke with patient and explained CM role in transition of care. Patient explained that he plans to get a shower chair for DME needed. -  Patient will Medicare a and b and with- [employer]. Patient explained that he has supportive wife Isa Mom, reachable at [phone]. CM to follow. Care Management Interventions  PCP Verified by CM:  Yes  Palliative Care Criteria Met (RRAT>21 & CHF Dx)?: No  Mode of Transport at Discharge: (home by car)  Transition of Care Consult (CM Consult): ([place]edom of Choice - patient selected At [address])  Jan Signup: No  Discharge Durable Medical Equipment: No  Health Maintenance Reviewed: Yes  Physical Therapy Consult: Yes  Occupational Therapy Consult: Yes  Current Support Network: Lives with Caregiver, Own Home  Confirm Follow Up Transport: Family  Discharge Location  Discharge Placement: (home with )  Emma Chanel  10:57 AM

## 2020-10-03 NOTE — DISCHARGE SUMMARY
Total Knee Discharge Summary  Patient ID:  Ana Tavera  [id number]  78 y.o.  1950    Admit date: 10/2/2020    Discharge date and time: No discharge date for patient encounter. Admitting Physician: Yeimi Cast MD (Jody)     Discharge Physician: Evelyn Dietrich. Kyung Peter MD    Admission Diagnoses: Degenerative arthritis of left knee [M17.12]    Discharge Diagnoses: Active Problems:    Degenerative arthritis of left knee (10/2/2020)        Date of Surgery: 10/2/2020     Procedure:  Procedure(s):  LEFT TOTAL KNEE REPLACEMENT (SPINAL W/MAC W/BLOCK) (REQ FLIP ROOMS)    Surgeon: Evelyn Dietrich. Kyung Peter MD    DVT Prophylaxis: ASA     Postoperative transfusions:     none Banked PRBCs     Post Op complications: none    Hemoglobin at discharge:  Lab Results   Component Value Date/Time    HGB 14.5 09/25/2020 09:48 AM    INR 1.0 09/25/2020 09:48 AM       HOSPITAL COURSE: Ana May is a pleasant 78 y.o. long standing patient with advanced osteoarthritis. The patient failed all conservative management. Therefore, Dr. Hardwick Cap and the patient decided the most appropriate plan of care is to proceed with a total knee arthroplasty, to be performed at [facility]. All preoperative clearance was done prior to surgery. The patient's complete history and physical, laboratory data and physical exam is well documented in hospital chart. Pre operative antiobiotics, Celebrex, transanamic acid administered prior to surgery. Ana May was admitted on 10/2/2020  and underwent successful Procedure(s):  LEFT TOTAL KNEE REPLACEMENT (SPINAL W/MAC W/BLOCK) (REQ FLIP ROOMS). There were no complications intraoperatively and the patient was transferred to the orthopaedic floor in stable condition. Physical therapy and occupational therapy initiated their evaluation and treatment and continued to follow the patient until the patient was discharged.  For pain control, the procedure was performend under a spinal block and a femoral block, and intra operative encapsulated cocktail administered intra-articularly into the capsule and soft tissues. Post operative  oxycodone was utilized as well as Celebrex with excellent pain relief and was well tolerated. DVT prophylaxis was initiated twith 325 mg of ASA  for two days and then starting 10mg of Xarelto daily. Compression stockings and bilateral foot pumps were all started post-operatively. The incision site remained clean, dry, and intact. The patient remained neurovascularly intact. Physical Therapy started on the day following surgery and progressed to independent ambulation with the aid of a walker. At the time of discharge on POD #1, the patient was able to ambulate safely, go up and down stairs and had an understanding of the explicit discharge precautions and instructions following surgery. At the time of discharge the wound appears to be healing without any evidence of infection. At the time of discharge, Gary Solis was able to go up and down stairs and had understanding of precautions needed following surgery. Discharged to: Home     Discharge instructions:   -Anticoagulate with 325 mg ASA twice daily.   -Resume pre hospital diet with normal changes for coumadin.   -Resume home medications per medication reconciliation form. No NSAIDs or aspirin products while on anticoagulation. Prescriptions for oxycodone 5mg tabs  pain medication and 325mg ASA twice a day were provided to the patient.   -Ambulate with an assisted device as instructed by PT/OT prior to discharge.   -Patient is to have Home Health services provided post operatively to include skilled nursing and physical therapy. Patient is to work hard on full extension and full flexion throughout the day at home.   -First follow-up appointment to be scheduled in approximately 3 weeks. Patient should call the office to confirm apptointment.    -Explicit discharge instructions were provided to the patient.       Signed:  Yeimi(Nancy Muñoz MD  10/3/2020  12:40 PM

## 2020-10-03 NOTE — ROUTINE PROCESS
Bedside shift change report given to Rosa Sosa RN (oncoming nurse) by Adriana Hashimoto (offgoing nurse). Report included the following information SBAR, Kardex, Procedure Summary, Intake/Output, MAR and Recent Results.

## 2020-10-05 NOTE — NURSE NAVIGATOR
[address]  [facility]off     FROM:                                TO: [address]                                                      ([address] [place] [address] or Facility name)  Felicity Mckeon [address]  [address] [place] [address]  Dept: [address] [facility] [address]: [phone]                                      Room#:  399/07                                                       Nurse Navigator:  Kem Wheatley RN         SITUATION      ASAScore: ASA 2 - Patient with mild systemic disease with no functional limitations    Admitted:  10/2/2020  Hospital Day: 2      Attending Provider:  No att. providers found     Consultations:  None    PCP:  Rianna De Souza MD   [phone]     Admitting Dx:  Degenerative arthritis of left knee [M17.12]       Active Problems:    Degenerative arthritis of left knee (10/2/2020)      3 Days Post-Op of   Procedure(s):  LEFT TOTAL KNEE REPLACEMENT (SPINAL W/MAC W/BLOCK) (REQ FLIP ROOMS)   BY: Yeimi Courtney MD (Jody)             ON: 10/2/2020                  Code Status: Prior             Advance Directive? Not Received (Send w/patient)     Isolation:  There are currently no Active Isolations       MDRO: No current active infections    BACKGROUND     Allergies:  No Known Allergies    Past Medical History:   Diagnosis Date    Arthritis     Cancer ([facility] [address] 2018    SKIN CANCER       Past Surgical History:   Procedure Laterality Date    HX ORTHOPAEDIC  1981    cervical fusion    HX ORTHOPAEDIC Left 2012    SCREWS PLACED IN LEFT CLAVICLE    HX VASECTOMY         Prior to Admission Medications   Prescriptions Last Dose Informant Patient Reported? Taking?   diclofenac EC (VOLTAREN) 50 mg EC tablet 9/30/2020  Yes No   Sig: Take 50 mg by mouth as needed for Pain. fish oil-omega-3 fatty acids (Fish Oil) 300-500 mg cap 9/25/2020 at Unknown time  Yes Yes   Sig: Take 1 Cap by mouth daily.    multivitamin (ONE A DAY) tablet 9/25/2020 at Unknown time  Yes Yes   Sig: Take 1 Tab by mouth daily. mupirocin (BACTROBAN) 2 % ointment 10/2/2020 at 0500  No No   Sig: by Both Nostrils route two (2) times a day for 7 days. mupirocin (BACTROBAN) 2 % ointment   Yes No   Sig: by Both Nostrils route two (2) times a day. Indications: MSSA   sildenafil citrate (VIAGRA) 100 mg tablet 6/2/2020  Yes No   Sig: Take 100 mg by mouth as needed for Erectile Dysfunction. Facility-Administered Medications: None       Vaccinations:    Immunization History   Administered Date(s) Administered    Influenza Vaccine [employer]) PF (>6 Mo Flulaval, Fluarix, and >3 Saurabh Chanda [address]) 10/03/2020         ASSESSMENT   Age: 78 y.o. Gender: male        Height: Height: 6' (182.9 cm)                    Weight:Weight: 107 kg (235 lb 14.3 oz)     No data found. Active Orders   There are no active orders of the following types: Diet. Orientation: Orientation Level: Oriented X4    Active Lines/Drains:  (Peg Tube / Saucedo / CL or S/L?):no    Urinary Status: Voiding      Last BM: Last Bowel Movement Date: 10/01/20     Skin Integrity: Incision (comment), Intact(aquacel dressing to left knee)             Mobility: Slightly limited   Weight Bearing Status: WBAT (Weight Bearing as Tolerated)      Gait Training  Assistive Device: Gait belt, Walker, rolling  Ambulation - Level of Assistance: Contact guard assistance  Distance (ft): 150 Feet (ft)  Stairs - Level of Assistance: Contact guard assistance  Number of Stairs Trained: 12  Rail Use: Right      On Anticoagulation?  YES  Aspirin                                         Pain Medications given:  oxycodone                                   Lab Results   Component Value Date/Time    Glucose 118 (H) 09/25/2020 09:48 AM    Hemoglobin A1c 5.4 09/25/2020 09:48 AM    INR 1.0 09/25/2020 09:48 AM    HGB 14.5 09/25/2020 09:48 AM       Readmission Risks:  Score:         RECOMMENDATION     See After Visit Summary (AVS) for:  · Discharge instructions  · After [address] [place] [address]   · Medication Reconciliation          [facility] Orthopaedic Nurse Navigator  ROSALIND Heath, RN-BC       Office  [phone]  Cell      [phone]  Fax      [phone]  [email or web address]             . [place]

## 2020-10-13 ENCOUNTER — PATIENT OUTREACH (OUTPATIENT)
Dept: CASE MANAGEMENT | Age: 70
End: 2020-10-13

## 2020-10-13 NOTE — PROGRESS NOTES
This note will not be viewable in [address] Post Discharge Follow-up contact after Joint Replacement    Patient discharged on 10/3/20  By  Yeimi Woods([place]) CARLOS   following  left knee Arthroplasty. Spoke with patient today, who reports they are \" surprised at the degree of pain that I have had after surgery. \"  Denies Fever, Shortness of Breath or Chest Pain. Home Health has visited. Patient also reports:  Incision  clean, dry, intact  Calf is non-tender, operative extremity has minimal swelling. Pain is well managed at this time with the use of oxycodone becoming less frequent. Discussed use of ice & elevation. Patient is progressing with therapy and is exercising independently. Taking Aspirin for anticoagulation, oxycodone for pain. Patient is experiencing symptoms of constipation & urinating without difficulty. Discussed side effects of anticoagulants & pain medications (bleeding/bruising, constipation, lightheaded/dizziness)  Follow up appointment is scheduled for 10/19. Discussed calling surgeon Dr Chris Bridges  for drainage, bleeding, swelling in operative extremity, fever or pain. Discussed calling PCP Dr Neno Collins with other medical issues.

## 2022-03-19 PROBLEM — M17.12 DEGENERATIVE ARTHRITIS OF LEFT KNEE: Status: ACTIVE | Noted: 2020-10-02

## 2022-10-10 PROBLEM — I10 ESSENTIAL HYPERTENSION: Status: ACTIVE | Noted: 2022-10-10

## 2022-12-03 ENCOUNTER — HOSPITAL ENCOUNTER (EMERGENCY)
Age: 72
Discharge: HOME | End: 2022-12-04
Attending: EMERGENCY MEDICINE
Payer: MEDICARE

## 2022-12-03 VITALS
RESPIRATION RATE: 16 BRPM | SYSTOLIC BLOOD PRESSURE: 153 MMHG | HEART RATE: 76 BPM | DIASTOLIC BLOOD PRESSURE: 72 MMHG | TEMPERATURE: 100.1 F | OXYGEN SATURATION: 95 %

## 2022-12-03 DIAGNOSIS — R50.9 FEVER, UNSPECIFIED FEVER CAUSE: ICD-10-CM

## 2022-12-03 DIAGNOSIS — K08.89 PAIN, DENTAL: Primary | ICD-10-CM

## 2022-12-03 LAB
ALBUMIN SERPL-MCNC: 3.8 G/DL (ref 3.5–5)
ALBUMIN/GLOB SERPL: 1.1 {RATIO} (ref 1.1–2.2)
ALP SERPL-CCNC: 50 U/L (ref 45–117)
ALT SERPL-CCNC: 36 U/L (ref 12–78)
ANION GAP SERPL CALC-SCNC: 3 MMOL/L (ref 5–15)
AST SERPL-CCNC: 14 U/L (ref 15–37)
BASOPHILS # BLD: 0.1 K/UL (ref 0–0.1)
BASOPHILS NFR BLD: 1 % (ref 0–1)
BILIRUB SERPL-MCNC: 0.6 MG/DL (ref 0.2–1)
BUN SERPL-MCNC: 29 MG/DL (ref 6–20)
BUN/CREAT SERPL: 24 (ref 12–20)
CALCIUM SERPL-MCNC: 9.2 MG/DL (ref 8.5–10.1)
CHLORIDE SERPL-SCNC: 104 MMOL/L (ref 97–108)
CO2 SERPL-SCNC: 27 MMOL/L (ref 21–32)
COMMENT, HOLDF: NORMAL
CREAT SERPL-MCNC: 1.22 MG/DL (ref 0.7–1.3)
DIFFERENTIAL METHOD BLD: ABNORMAL
EOSINOPHIL # BLD: 0.4 K/UL (ref 0–0.4)
EOSINOPHIL NFR BLD: 5 % (ref 0–7)
ERYTHROCYTE [DISTWIDTH] IN BLOOD BY AUTOMATED COUNT: 11.9 % (ref 11.5–14.5)
GLOBULIN SER CALC-MCNC: 3.6 G/DL (ref 2–4)
GLUCOSE SERPL-MCNC: 123 MG/DL (ref 65–100)
HCT VFR BLD AUTO: 42.7 % (ref 36.6–50.3)
HGB BLD-MCNC: 14.6 G/DL (ref 12.1–17)
IMM GRANULOCYTES # BLD AUTO: 0 K/UL (ref 0–0.04)
IMM GRANULOCYTES NFR BLD AUTO: 0 % (ref 0–0.5)
LACTATE SERPL-SCNC: 1.2 MMOL/L (ref 0.4–2)
LYMPHOCYTES # BLD: 0.9 K/UL (ref 0.8–3.5)
LYMPHOCYTES NFR BLD: 11 % (ref 12–49)
MCH RBC QN AUTO: 31.2 PG (ref 26–34)
MCHC RBC AUTO-ENTMCNC: 34.2 G/DL (ref 30–36.5)
MCV RBC AUTO: 91.2 FL (ref 80–99)
MONOCYTES # BLD: 0.6 K/UL (ref 0–1)
MONOCYTES NFR BLD: 7 % (ref 5–13)
NEUTS SEG # BLD: 6.3 K/UL (ref 1.8–8)
NEUTS SEG NFR BLD: 76 % (ref 32–75)
NRBC # BLD: 0 K/UL (ref 0–0.01)
NRBC BLD-RTO: 0 PER 100 WBC
PLATELET # BLD AUTO: 268 K/UL (ref 150–400)
PMV BLD AUTO: 8.9 FL (ref 8.9–12.9)
POTASSIUM SERPL-SCNC: 4.1 MMOL/L (ref 3.5–5.1)
PROT SERPL-MCNC: 7.4 G/DL (ref 6.4–8.2)
RBC # BLD AUTO: 4.68 M/UL (ref 4.1–5.7)
SAMPLES BEING HELD,HOLD: NORMAL
SODIUM SERPL-SCNC: 134 MMOL/L (ref 136–145)
WBC # BLD AUTO: 8.3 K/UL (ref 4.1–11.1)

## 2022-12-03 PROCEDURE — 83605 ASSAY OF LACTIC ACID: CPT

## 2022-12-03 PROCEDURE — 85025 COMPLETE CBC W/AUTO DIFF WBC: CPT

## 2022-12-03 PROCEDURE — 87040 BLOOD CULTURE FOR BACTERIA: CPT

## 2022-12-03 PROCEDURE — 80053 COMPREHEN METABOLIC PANEL: CPT

## 2022-12-03 PROCEDURE — 99285 EMERGENCY DEPT VISIT HI MDM: CPT

## 2022-12-03 PROCEDURE — 96361 HYDRATE IV INFUSION ADD-ON: CPT

## 2022-12-03 PROCEDURE — 96375 TX/PRO/DX INJ NEW DRUG ADDON: CPT

## 2022-12-03 PROCEDURE — 36415 COLL VENOUS BLD VENIPUNCTURE: CPT

## 2022-12-03 PROCEDURE — 96374 THER/PROPH/DIAG INJ IV PUSH: CPT

## 2022-12-04 ENCOUNTER — APPOINTMENT (OUTPATIENT)
Dept: CT IMAGING | Age: 72
End: 2022-12-04
Attending: EMERGENCY MEDICINE
Payer: MEDICARE

## 2022-12-04 LAB
COVID-19 RAPID TEST, COVR: NOT DETECTED
FLUAV AG NPH QL IA: NEGATIVE
FLUBV AG NOSE QL IA: NEGATIVE
SOURCE, COVRS: NORMAL

## 2022-12-04 PROCEDURE — 74011250637 HC RX REV CODE- 250/637: Performed by: EMERGENCY MEDICINE

## 2022-12-04 PROCEDURE — 96361 HYDRATE IV INFUSION ADD-ON: CPT

## 2022-12-04 PROCEDURE — 70487 CT MAXILLOFACIAL W/DYE: CPT

## 2022-12-04 PROCEDURE — 74011250636 HC RX REV CODE- 250/636: Performed by: EMERGENCY MEDICINE

## 2022-12-04 PROCEDURE — 96375 TX/PRO/DX INJ NEW DRUG ADDON: CPT

## 2022-12-04 PROCEDURE — 74011000636 HC RX REV CODE- 636: Performed by: RADIOLOGY

## 2022-12-04 PROCEDURE — 87804 INFLUENZA ASSAY W/OPTIC: CPT

## 2022-12-04 PROCEDURE — 96374 THER/PROPH/DIAG INJ IV PUSH: CPT

## 2022-12-04 PROCEDURE — 87635 SARS-COV-2 COVID-19 AMP PRB: CPT

## 2022-12-04 RX ORDER — MORPHINE SULFATE 4 MG/ML
4 INJECTION INTRAVENOUS ONCE
Status: COMPLETED | OUTPATIENT
Start: 2022-12-04 | End: 2022-12-04

## 2022-12-04 RX ORDER — KETOROLAC TROMETHAMINE 30 MG/ML
15 INJECTION, SOLUTION INTRAMUSCULAR; INTRAVENOUS ONCE
Status: COMPLETED | OUTPATIENT
Start: 2022-12-04 | End: 2022-12-04

## 2022-12-04 RX ORDER — AMOXICILLIN AND CLAVULANATE POTASSIUM 875; 125 MG/1; MG/1
1 TABLET, FILM COATED ORAL
Status: COMPLETED | OUTPATIENT
Start: 2022-12-04 | End: 2022-12-04

## 2022-12-04 RX ADMIN — IOPAMIDOL 100 ML: 612 INJECTION, SOLUTION INTRAVENOUS at 00:44

## 2022-12-04 RX ADMIN — KETOROLAC TROMETHAMINE 15 MG: 30 INJECTION, SOLUTION INTRAMUSCULAR; INTRAVENOUS at 02:19

## 2022-12-04 RX ADMIN — MORPHINE SULFATE 4 MG: 4 INJECTION INTRAVENOUS at 02:19

## 2022-12-04 RX ADMIN — SODIUM CHLORIDE 1000 ML: 9 INJECTION, SOLUTION INTRAVENOUS at 02:30

## 2022-12-04 RX ADMIN — AMOXICILLIN AND CLAVULANATE POTASSIUM 1 TABLET: 875; 125 TABLET, FILM COATED ORAL at 03:52

## 2022-12-04 NOTE — ED TRIAGE NOTES
Pt arrives with CC of fever, chills, headache and jaw pain after having dental surgery last week.  Pt had temp of 105 at home and took 1000mg tylenol at 1845

## 2022-12-04 NOTE — ED PROVIDER NOTES
Pt is a 80 yo female with hx of arthritis, skin cancer, dental surgery 2 weeks ago who p/w ongoing dental pain, chills and new fever today. Pt says he has been having intermittent right upper jaw throbbing pain, and he is having no relief with oxycodone and hydrocodone at home prescribed be the dentist. He has not called the dentist and let them know that his pain is not controlled and say 'i thought it would get better'. Reports procedure in right lower jaw and left upper jaw. No trouble breathing or swallowing, no facial swelling or redness. Past Medical History:   Diagnosis Date    Arthritis     Cancer ([facility] [address] 2018    SKIN CANCER       Past Surgical History:   Procedure Laterality Date    HX ORTHOPAEDIC  1981    cervical fusion    HX ORTHOPAEDIC Left 2012    SCREWS PLACED IN LEFT CLAVICLE    HX VASECTOMY           Family History:   Problem Relation Age of Onset    Hypertension Father     Heart Disease Father     OSTEOARTHRITIS Mother     No Known Problems Sister     Anesth Problems Neg Hx        Social History     Socioeconomic History    Marital status:      Spouse name: Not on file    Number of children: Not on file    Years of education: Not on file    Highest education level: Not on file   Occupational History    Not on file   Tobacco Use    Smoking status: Never    Smokeless tobacco: Never   Substance and Sexual Activity    Alcohol use:  Yes     Alcohol/week: 15.0 standard drinks     Types: 14 Cans of beer, 1 Glasses of wine per week    Drug use: No    Sexual activity: Not on file   Other Topics Concern    Not on file   Social History Narrative    Not on file     Social Determinants of Health     Financial Resource Strain: Not on file   Food Insecurity: Not on file   Transportation Needs: Not on file   Physical Activity: Not on file   Stress: Not on file   Social Connections: Not on file   Intimate Partner Violence: Not on file   Housing Stability: Not on file         ALLERGIES: Patient has no known allergies. Review of Systems   Constitutional:  Positive for fever. Negative for chills. HENT:  Positive for dental problem. Negative for drooling and nosebleeds. Eyes:  Negative for pain and itching. Respiratory:  Negative for choking and stridor. Cardiovascular:  Negative for leg swelling. Gastrointestinal:  Negative for abdominal pain and rectal pain. Endocrine: Negative for heat intolerance and polyphagia. Genitourinary:  Negative for enuresis and genital sores. Musculoskeletal:  Negative for arthralgias and joint swelling. Skin:  Negative for color change. Allergic/Immunologic: Negative for immunocompromised state. Neurological:  Negative for tremors and speech difficulty. Hematological:  Negative for adenopathy. Psychiatric/Behavioral:  Negative for dysphoric mood and sleep disturbance. Vitals:    12/03/22 1917 12/03/22 2256   BP: (!) 153/72    Pulse: 76    Resp: 16    Temp: (!) 100.6 °F (38.1 °C) 100.1 °F (37.8 °C)   SpO2: 95%             Physical Exam  Vitals and nursing note reviewed. Constitutional:       General: He is not in acute distress. Appearance: He is well-developed. He is not ill-appearing, toxic-appearing or diaphoretic. HENT:      Head: Normocephalic. Nose: Nose normal.      Mouth/Throat:      Dentition: No dental tenderness, gingival swelling or dental abscesses. Pharynx: Oropharynx is clear. Uvula midline. Comments: Suture intact along right lower jaw. No abscess, no swelling, no erythema. Suture noted along left upper jaw with no evidence of infection. Eyes:      Conjunctiva/sclera: Conjunctivae normal.   Cardiovascular:      Rate and Rhythm: Normal rate and regular rhythm. Pulmonary:      Effort: Pulmonary effort is normal. No respiratory distress. Abdominal:      General: There is no distension. Palpations: Abdomen is soft. Musculoskeletal:         General: No deformity. Normal range of motion.       Cervical back: Normal range of motion and neck supple. Skin:     General: Skin is warm and dry. Neurological:      Mental Status: He is alert. Coordination: Coordination normal.   Psychiatric:         Behavior: Behavior normal.        MDM  Number of Diagnoses or Management Options  Fever, unspecified fever cause  Pain, dental  Diagnosis management comments: Pt remains non toxic appearing. No evidence of active infection requiring drainage today. He is already on amoxicillin per his dentist that he will continue taking. Will call his dentist in the AM. Pt does not want to stay for return of covid and flu swabs. They say they will check mychart. Will dc. ED Course as of 12/04/22 0407   Sun Dec 04, 2022   0255 Per RN, pt wanted to leave. I spoke with him. He is already on amoxicillin per his dentist. He is agreeable to getting swabbed for covid and flu. [AL]      ED Course User Index  [AL] Arsenio Rao MD       Procedures    Patient's results have been reviewed with them. Patient and/or family have verbally conveyed their understanding and agreement of the patient's signs, symptoms, diagnosis, treatment and prognosis and additionally agree to follow up as recommended or return to the Emergency Room should their condition change prior to follow-up. Discharge instructions have also been provided to the patient with some educational information regarding their diagnosis as well a list of reasons why they would want to return to the ER prior to their follow-up appointment should their condition change.

## 2022-12-04 NOTE — DISCHARGE INSTRUCTIONS
Your CT scan showed some swelling but no evidence of an abscess. Please continue taking the amoxicillin prescribed and use the pain meds prescribed for pain. Talk to your dentist and return immediately for worsening symptoms. Thank you.

## 2022-12-08 PROBLEM — R56.9 UNSPECIFIED CONVULSIONS (HCC): Status: ACTIVE | Noted: 2022-12-08

## 2022-12-08 PROBLEM — G40.89 OTHER SEIZURES (HCC): Status: RESOLVED | Noted: 2022-12-08 | Resolved: 2022-12-08

## 2022-12-08 PROBLEM — G40.89 OTHER SEIZURES (HCC): Status: ACTIVE | Noted: 2022-12-08

## 2022-12-08 PROBLEM — R56.9 UNSPECIFIED CONVULSIONS (HCC): Status: RESOLVED | Noted: 2022-12-08 | Resolved: 2022-12-08

## 2022-12-08 LAB
BACTERIA SPEC CULT: NORMAL
SERVICE CMNT-IMP: NORMAL

## 2023-05-12 ENCOUNTER — TRANSCRIBE ORDERS (OUTPATIENT)
Facility: HOSPITAL | Age: 73
End: 2023-05-12

## 2023-05-12 ENCOUNTER — HOSPITAL ENCOUNTER (OUTPATIENT)
Facility: HOSPITAL | Age: 73
End: 2023-05-12
Payer: MEDICARE

## 2023-05-12 DIAGNOSIS — M21.372 LEFT FOOT DROP: ICD-10-CM

## 2023-05-12 DIAGNOSIS — M21.372 LEFT FOOT DROP: Primary | ICD-10-CM

## 2023-05-12 PROCEDURE — 73721 MRI JNT OF LWR EXTRE W/O DYE: CPT

## 2023-05-19 RX ORDER — SILDENAFIL 100 MG/1
100 TABLET, FILM COATED ORAL PRN
COMMUNITY
Start: 2023-03-27

## 2023-05-19 RX ORDER — LAMOTRIGINE 25 MG/1
TABLET ORAL
COMMUNITY
Start: 2022-12-14

## 2023-05-19 RX ORDER — ASPIRIN 325 MG
325 TABLET, DELAYED RELEASE (ENTERIC COATED) ORAL 2 TIMES DAILY
COMMUNITY
Start: 2020-10-03

## 2024-06-20 ENCOUNTER — TRANSCRIBE ORDERS (OUTPATIENT)
Facility: HOSPITAL | Age: 74
End: 2024-06-20

## 2024-06-20 DIAGNOSIS — M76.62 ACHILLES TENDINITIS OF LEFT LOWER EXTREMITY: Primary | ICD-10-CM

## 2024-06-21 ENCOUNTER — HOSPITAL ENCOUNTER (OUTPATIENT)
Facility: HOSPITAL | Age: 74
End: 2024-06-21
Payer: MEDICARE

## 2024-06-21 DIAGNOSIS — M76.62 ACHILLES TENDINITIS OF LEFT LOWER EXTREMITY: ICD-10-CM

## 2024-06-21 PROCEDURE — 73721 MRI JNT OF LWR EXTRE W/O DYE: CPT

## 2025-03-07 ENCOUNTER — HOSPITAL ENCOUNTER (OUTPATIENT)
Facility: HOSPITAL | Age: 75
Discharge: HOME | End: 2025-03-10
Payer: MEDICARE

## 2025-03-07 VITALS
RESPIRATION RATE: 18 BRPM | HEART RATE: 73 BPM | HEIGHT: 73 IN | BODY MASS INDEX: 29.92 KG/M2 | WEIGHT: 225.75 LBS | SYSTOLIC BLOOD PRESSURE: 119 MMHG | TEMPERATURE: 98 F | DIASTOLIC BLOOD PRESSURE: 79 MMHG

## 2025-03-07 LAB
ABO + RH BLD: NORMAL
APPEARANCE UR: CLEAR
BACTERIA URNS QL MICRO: NEGATIVE /HPF
BILIRUB UR QL: NEGATIVE
BLOOD GROUP ANTIBODIES SERPL: NORMAL
COLOR UR: ABNORMAL
EKG ATRIAL RATE: 61 BPM
EKG DIAGNOSIS: NORMAL
EKG P AXIS: 18 DEGREES
EKG P-R INTERVAL: 222 MS
EKG Q-T INTERVAL: 382 MS
EKG QRS DURATION: 88 MS
EKG QTC CALCULATION (BAZETT): 384 MS
EKG R AXIS: -7 DEGREES
EKG T AXIS: 42 DEGREES
EKG VENTRICULAR RATE: 61 BPM
EPITH CASTS URNS QL MICRO: ABNORMAL /LPF
EST. AVERAGE GLUCOSE BLD GHB EST-MCNC: 117 MG/DL
GLUCOSE UR STRIP.AUTO-MCNC: NEGATIVE MG/DL
HBA1C MFR BLD: 5.7 % (ref 4–5.6)
HGB UR QL STRIP: NEGATIVE
HYALINE CASTS URNS QL MICRO: ABNORMAL /LPF (ref 0–5)
INR PPP: 1.1 (ref 0.9–1.1)
KETONES UR QL STRIP.AUTO: ABNORMAL MG/DL
LEUKOCYTE ESTERASE UR QL STRIP.AUTO: NEGATIVE
NITRITE UR QL STRIP.AUTO: NEGATIVE
PH UR STRIP: 5.5 (ref 5–8)
PROT UR STRIP-MCNC: NEGATIVE MG/DL
PROTHROMBIN TIME: 11.2 SEC (ref 9.2–11.2)
RBC #/AREA URNS HPF: ABNORMAL /HPF (ref 0–5)
SP GR UR REFRACTOMETRY: 1.02 (ref 1–1.03)
SPECIMEN EXP DATE BLD: NORMAL
URINE CULTURE IF INDICATED: ABNORMAL
UROBILINOGEN UR QL STRIP.AUTO: 0.2 EU/DL (ref 0.2–1)
WBC URNS QL MICRO: ABNORMAL /HPF (ref 0–4)

## 2025-03-07 PROCEDURE — 86850 RBC ANTIBODY SCREEN: CPT

## 2025-03-07 PROCEDURE — 86901 BLOOD TYPING SEROLOGIC RH(D): CPT

## 2025-03-07 PROCEDURE — 93005 ELECTROCARDIOGRAM TRACING: CPT | Performed by: ORTHOPAEDIC SURGERY

## 2025-03-07 PROCEDURE — 86900 BLOOD TYPING SEROLOGIC ABO: CPT

## 2025-03-07 PROCEDURE — 85610 PROTHROMBIN TIME: CPT

## 2025-03-07 PROCEDURE — 81001 URINALYSIS AUTO W/SCOPE: CPT

## 2025-03-07 PROCEDURE — 83036 HEMOGLOBIN GLYCOSYLATED A1C: CPT

## 2025-03-07 PROCEDURE — 93010 ELECTROCARDIOGRAM REPORT: CPT | Performed by: INTERNAL MEDICINE

## 2025-03-07 RX ORDER — MULTIVIT-MIN/FERROUS GLUCONATE 9 MG/15 ML
15 LIQUID (ML) ORAL DAILY
COMMUNITY

## 2025-03-07 ASSESSMENT — PAIN SCALES - GENERAL: PAINLEVEL_OUTOF10: 0

## 2025-03-07 NOTE — PERIOP NOTE
[address] [address]   MAIN OR                     [phone]    MAIN PRE OP             [phone]                                                                                AMBULATORY PRE OP          [phone]  PRE-ADMISSION TESTING    [phone]     Surgery Date:  3/21/25       Is surgery arrival time given by surgeon?  YES  NO    If “NO”, [facility] staff will call you between 4 and 7pm the day before your surgery with your arrival time. (If your surgery is on a Monday, we will call you the Friday before.)    Call [phone] after 7pm Monday-Friday if you did not receive this call.    INSTRUCTIONS BEFORE YOUR SURGERY   When You  Arrive Arrive at [facility] on 1st floor the day of your surgery.  Have your insurance card, photo ID,living will/advanced directive/POA (if applicable),  and any copayment (if needed)   Food   and   Drink NO solid food after midnight the night before surgery. You can drink clear liquids from midnight until ONE hour prior to your arrival at the hospital on the day of your surgery. Clear liquids include:  Water  Apple juice (no sediment)  Carbonated beverages  Black coffee(no cream/milk)  Tea(no cream/milk)  Gatorade    No alcohol (beer, wine, liquor) or marijuana (smoking) 24 hours, edibles (3 days). Stop smoking cigarettes 14 days before surgery (helps w/healing and breathing).   Medications to   TAKE   Morning of Surgery MEDICATIONS TO TAKE THE MORNING OF SURGERY WITH A SIP OF WATER: NONE    You may take these medications, IF NEEDED, the morning of surgery: NONE    Ask your surgeon/prescribing doctor for instructions on taking or stopping these medications prior to surgery: NONE   Medications to STOP  before surgery Non-Steroidal anti-inflammatory Drugs (NSAID's): for example, Diclofenac(Voltaren),  Ibuprofen (Advil, Motrin), Naproxen (Aleve) 3 days    STOP all herbal supplements and  with alcohol before use and do not use both at the same time.  If you are scheduled for COVID testing during the 5 days, do NOT apply morning dose until after the COVID test has been performed.        Follow all instructions so your surgery won’t be cancelled.  Please, be on time.                    If a situation occurs and you are delayed the day of surgery, call [phone]/ [phone].    If your physical condition changes (like a fever, cold, flu, etc.) call your surgeon as soon as possible.    Home medication(s) reviewed and verified via during PAT appointment/call.    The patient was contacted in person.     He verbalized understanding of all instructions does not need reinforcement.

## 2025-03-08 LAB
BACTERIA SPEC CULT: NORMAL
BACTERIA SPEC CULT: NORMAL
SERVICE CMNT-IMP: NORMAL

## 2025-03-20 ENCOUNTER — ANESTHESIA EVENT (OUTPATIENT)
Facility: HOSPITAL | Age: 75
End: 2025-03-20
Payer: MEDICARE

## 2025-03-20 NOTE — ANESTHESIA PRE PROCEDURE
found for: \"PHART\", \"PO2ART\", \"JGZ1BUK\", \"DAV6PLW\", \"BEART\", \"I6VFFXWH\"     Type & Screen (If Applicable):  Lab Results   Component Value Date    ABORH A NEGATIVE 03/07/2025    LABANTI NEG 03/07/2025       Drug/Infectious Status (If Applicable):  Lab Results   Component Value Date/Time    HEPCAB <0.1 01/30/2019 09:25 AM       COVID-19 Screening (If Applicable):   Lab Results   Component Value Date/Time    COVID19 Not detected 12/04/2022 03:58 AM    COVID19 Not Detected 09/28/2020 09:15 AM           Anesthesia Evaluation  Patient summary reviewed and Nursing notes reviewed  Airway: Mallampati: II  TM distance: >3 FB   Neck ROM: full  Mouth opening: > = 3 FB   Dental: normal exam         Pulmonary:Negative Pulmonary ROS breath sounds clear to auscultation                             Cardiovascular:    (+) hypertension:    (-) CAD      Rhythm: regular  Rate: normal                    Neuro/Psych:   Negative Neuro/Psych ROS              GI/Hepatic/Renal: Neg GI/Hepatic/Renal ROS            Endo/Other: Negative Endo/Other ROS                    Abdominal:             Vascular: negative vascular ROS.         Other Findings:             Anesthesia Plan      regional and spinal     ASA 2           MIPS: Postoperative opioids intended.  Anesthetic plan and risks discussed with patient.      Plan discussed with CRNA.                    Enoch Savage MD   3/20/2025

## 2025-03-21 ENCOUNTER — APPOINTMENT (OUTPATIENT)
Facility: HOSPITAL | Age: 75
End: 2025-03-21
Attending: ORTHOPAEDIC SURGERY
Payer: MEDICARE

## 2025-03-21 ENCOUNTER — HOSPITAL ENCOUNTER (OUTPATIENT)
Facility: HOSPITAL | Age: 75
Setting detail: OBSERVATION
Discharge: HOME HEALTH | End: 2025-03-22
Attending: ORTHOPAEDIC SURGERY | Admitting: ORTHOPAEDIC SURGERY
Payer: MEDICARE

## 2025-03-21 ENCOUNTER — ANESTHESIA (OUTPATIENT)
Facility: HOSPITAL | Age: 75
End: 2025-03-21
Payer: MEDICARE

## 2025-03-21 DIAGNOSIS — M17.11 PRIMARY OSTEOARTHRITIS OF RIGHT KNEE: Primary | ICD-10-CM

## 2025-03-21 PROBLEM — M19.90 DEGENERATIVE JOINT DISEASE: Status: ACTIVE | Noted: 2025-03-21

## 2025-03-21 LAB
GLUCOSE BLD STRIP.AUTO-MCNC: 114 MG/DL (ref 65–117)
SERVICE CMNT-IMP: NORMAL

## 2025-03-21 PROCEDURE — 94760 N-INVAS EAR/PLS OXIMETRY 1: CPT

## 2025-03-21 PROCEDURE — 64447 NJX AA&/STRD FEMORAL NRV IMG: CPT | Performed by: ANESTHESIOLOGY

## 2025-03-21 PROCEDURE — G0378 HOSPITAL OBSERVATION PER HR: HCPCS

## 2025-03-21 PROCEDURE — 7100000001 HC PACU RECOVERY - ADDTL 15 MIN: Performed by: ORTHOPAEDIC SURGERY

## 2025-03-21 PROCEDURE — 2500000003 HC RX 250 WO HCPCS: Performed by: ORTHOPAEDIC SURGERY

## 2025-03-21 PROCEDURE — C1713 ANCHOR/SCREW BN/BN,TIS/BN: HCPCS | Performed by: ORTHOPAEDIC SURGERY

## 2025-03-21 PROCEDURE — 6360000002 HC RX W HCPCS: Performed by: ANESTHESIOLOGY

## 2025-03-21 PROCEDURE — 3600000015 HC SURGERY LEVEL 5 ADDTL 15MIN: Performed by: ORTHOPAEDIC SURGERY

## 2025-03-21 PROCEDURE — 6370000000 HC RX 637 (ALT 250 FOR IP): Performed by: ORTHOPAEDIC SURGERY

## 2025-03-21 PROCEDURE — 2500000003 HC RX 250 WO HCPCS: Performed by: ANESTHESIOLOGY

## 2025-03-21 PROCEDURE — 6360000002 HC RX W HCPCS: Performed by: ORTHOPAEDIC SURGERY

## 2025-03-21 PROCEDURE — 3700000000 HC ANESTHESIA ATTENDED CARE: Performed by: ORTHOPAEDIC SURGERY

## 2025-03-21 PROCEDURE — 7100000000 HC PACU RECOVERY - FIRST 15 MIN: Performed by: ORTHOPAEDIC SURGERY

## 2025-03-21 PROCEDURE — 2580000003 HC RX 258: Performed by: ORTHOPAEDIC SURGERY

## 2025-03-21 PROCEDURE — 2720000010 HC SURG SUPPLY STERILE: Performed by: ORTHOPAEDIC SURGERY

## 2025-03-21 PROCEDURE — 2709999900 HC NON-CHARGEABLE SUPPLY: Performed by: ORTHOPAEDIC SURGERY

## 2025-03-21 PROCEDURE — 3600000005 HC SURGERY LEVEL 5 BASE: Performed by: ORTHOPAEDIC SURGERY

## 2025-03-21 PROCEDURE — 3700000001 HC ADD 15 MINUTES (ANESTHESIA): Performed by: ORTHOPAEDIC SURGERY

## 2025-03-21 PROCEDURE — 2580000003 HC RX 258: Performed by: ANESTHESIOLOGY

## 2025-03-21 PROCEDURE — 73560 X-RAY EXAM OF KNEE 1 OR 2: CPT

## 2025-03-21 PROCEDURE — C1776 JOINT DEVICE (IMPLANTABLE): HCPCS | Performed by: ORTHOPAEDIC SURGERY

## 2025-03-21 PROCEDURE — 82962 GLUCOSE BLOOD TEST: CPT

## 2025-03-21 DEVICE — IMPLANTABLE DEVICE: Type: IMPLANTABLE DEVICE | Site: KNEE | Status: FUNCTIONAL

## 2025-03-21 DEVICE — PSN TIB STM 5 DEG SZ H R: Type: IMPLANTABLE DEVICE | Site: KNEE | Status: FUNCTIONAL

## 2025-03-21 DEVICE — KIT KNEE IMPL CAP K1 HEMI CEM VE BEAR VE LNR K1VEVEZIMMERBIOMET: Type: IMPLANTABLE DEVICE | Status: FUNCTIONAL

## 2025-03-21 DEVICE — SMARTSET HIGH PERFORMANCE MV MEDIUM VISCOSITY BONE CEMENT 40G
Type: IMPLANTABLE DEVICE | Site: KNEE | Status: FUNCTIONAL
Brand: SMARTSET

## 2025-03-21 DEVICE — IMPLANTABLE DEVICE
Type: IMPLANTABLE DEVICE | Site: KNEE | Status: FUNCTIONAL
Brand: PERSONA® VIVACIT-E®

## 2025-03-21 RX ORDER — SODIUM CHLORIDE 9 MG/ML
INJECTION, SOLUTION INTRAVENOUS PRN
Status: DISCONTINUED | OUTPATIENT
Start: 2025-03-21 | End: 2025-03-21 | Stop reason: HOSPADM

## 2025-03-21 RX ORDER — NALOXONE HYDROCHLORIDE 0.4 MG/ML
INJECTION, SOLUTION INTRAMUSCULAR; INTRAVENOUS; SUBCUTANEOUS PRN
Status: DISCONTINUED | OUTPATIENT
Start: 2025-03-21 | End: 2025-03-21 | Stop reason: HOSPADM

## 2025-03-21 RX ORDER — SODIUM CHLORIDE 0.9 % (FLUSH) 0.9 %
5-40 SYRINGE (ML) INJECTION PRN
Status: DISCONTINUED | OUTPATIENT
Start: 2025-03-21 | End: 2025-03-21 | Stop reason: HOSPADM

## 2025-03-21 RX ORDER — ACETAMINOPHEN 325 MG/1
650 TABLET ORAL EVERY 6 HOURS
Status: DISCONTINUED | OUTPATIENT
Start: 2025-03-21 | End: 2025-03-22 | Stop reason: HOSPADM

## 2025-03-21 RX ORDER — SODIUM CHLORIDE 0.9 % (FLUSH) 0.9 %
5-40 SYRINGE (ML) INJECTION EVERY 12 HOURS SCHEDULED
Status: DISCONTINUED | OUTPATIENT
Start: 2025-03-21 | End: 2025-03-22 | Stop reason: HOSPADM

## 2025-03-21 RX ORDER — SODIUM CHLORIDE 9 MG/ML
INJECTION, SOLUTION INTRAVENOUS CONTINUOUS
Status: DISCONTINUED | OUTPATIENT
Start: 2025-03-21 | End: 2025-03-22 | Stop reason: HOSPADM

## 2025-03-21 RX ORDER — DIPHENHYDRAMINE HYDROCHLORIDE 50 MG/ML
25 INJECTION, SOLUTION INTRAMUSCULAR; INTRAVENOUS EVERY 6 HOURS PRN
Status: DISCONTINUED | OUTPATIENT
Start: 2025-03-21 | End: 2025-03-22 | Stop reason: HOSPADM

## 2025-03-21 RX ORDER — OXYCODONE HYDROCHLORIDE 5 MG/1
10 TABLET ORAL EVERY 4 HOURS PRN
Status: DISCONTINUED | OUTPATIENT
Start: 2025-03-21 | End: 2025-03-22 | Stop reason: HOSPADM

## 2025-03-21 RX ORDER — POLYETHYLENE GLYCOL 3350 17 G/17G
17 POWDER, FOR SOLUTION ORAL DAILY PRN
Status: DISCONTINUED | OUTPATIENT
Start: 2025-03-21 | End: 2025-03-22 | Stop reason: HOSPADM

## 2025-03-21 RX ORDER — PROCHLORPERAZINE EDISYLATE 5 MG/ML
5 INJECTION INTRAMUSCULAR; INTRAVENOUS
Status: DISCONTINUED | OUTPATIENT
Start: 2025-03-21 | End: 2025-03-21 | Stop reason: HOSPADM

## 2025-03-21 RX ORDER — SODIUM CHLORIDE 0.9 % (FLUSH) 0.9 %
5-40 SYRINGE (ML) INJECTION PRN
Status: DISCONTINUED | OUTPATIENT
Start: 2025-03-21 | End: 2025-03-22 | Stop reason: HOSPADM

## 2025-03-21 RX ORDER — DEXMEDETOMIDINE HYDROCHLORIDE 100 UG/ML
INJECTION, SOLUTION INTRAVENOUS
Status: DISCONTINUED | OUTPATIENT
Start: 2025-03-21 | End: 2025-03-21 | Stop reason: SDUPTHER

## 2025-03-21 RX ORDER — FAMOTIDINE 20 MG/1
20 TABLET, FILM COATED ORAL 2 TIMES DAILY
Status: DISCONTINUED | OUTPATIENT
Start: 2025-03-21 | End: 2025-03-22 | Stop reason: HOSPADM

## 2025-03-21 RX ORDER — ASPIRIN 81 MG/1
81 TABLET, CHEWABLE ORAL 2 TIMES DAILY
COMMUNITY
Start: 2025-03-21

## 2025-03-21 RX ORDER — MELOXICAM 7.5 MG/1
3.75 TABLET ORAL DAILY
Status: DISCONTINUED | OUTPATIENT
Start: 2025-03-21 | End: 2025-03-22 | Stop reason: HOSPADM

## 2025-03-21 RX ORDER — DIPHENHYDRAMINE HCL 25 MG
25 CAPSULE ORAL EVERY 6 HOURS PRN
Status: DISCONTINUED | OUTPATIENT
Start: 2025-03-21 | End: 2025-03-22 | Stop reason: HOSPADM

## 2025-03-21 RX ORDER — OXYCODONE HYDROCHLORIDE 5 MG/1
5 TABLET ORAL EVERY 4 HOURS PRN
Status: DISCONTINUED | OUTPATIENT
Start: 2025-03-21 | End: 2025-03-22 | Stop reason: HOSPADM

## 2025-03-21 RX ORDER — SODIUM CHLORIDE 0.9 % (FLUSH) 0.9 %
5-40 SYRINGE (ML) INJECTION EVERY 12 HOURS SCHEDULED
Status: DISCONTINUED | OUTPATIENT
Start: 2025-03-21 | End: 2025-03-21 | Stop reason: HOSPADM

## 2025-03-21 RX ORDER — SODIUM CHLORIDE 9 MG/ML
INJECTION, SOLUTION INTRAVENOUS PRN
Status: DISCONTINUED | OUTPATIENT
Start: 2025-03-21 | End: 2025-03-22 | Stop reason: HOSPADM

## 2025-03-21 RX ORDER — ROPIVACAINE HYDROCHLORIDE 5 MG/ML
30 INJECTION, SOLUTION EPIDURAL; INFILTRATION; PERINEURAL ONCE
Status: DISCONTINUED | OUTPATIENT
Start: 2025-03-21 | End: 2025-03-21 | Stop reason: HOSPADM

## 2025-03-21 RX ORDER — ONDANSETRON 4 MG/1
4 TABLET, ORALLY DISINTEGRATING ORAL EVERY 8 HOURS PRN
Status: DISCONTINUED | OUTPATIENT
Start: 2025-03-21 | End: 2025-03-22 | Stop reason: HOSPADM

## 2025-03-21 RX ORDER — ONDANSETRON 2 MG/ML
4 INJECTION INTRAMUSCULAR; INTRAVENOUS EVERY 6 HOURS PRN
Status: DISCONTINUED | OUTPATIENT
Start: 2025-03-21 | End: 2025-03-22 | Stop reason: HOSPADM

## 2025-03-21 RX ORDER — ONDANSETRON 2 MG/ML
4 INJECTION INTRAMUSCULAR; INTRAVENOUS
Status: DISCONTINUED | OUTPATIENT
Start: 2025-03-21 | End: 2025-03-21 | Stop reason: HOSPADM

## 2025-03-21 RX ORDER — FENTANYL CITRATE 50 UG/ML
25 INJECTION, SOLUTION INTRAMUSCULAR; INTRAVENOUS EVERY 5 MIN PRN
Status: DISCONTINUED | OUTPATIENT
Start: 2025-03-21 | End: 2025-03-21 | Stop reason: HOSPADM

## 2025-03-21 RX ORDER — OXYCODONE HYDROCHLORIDE 5 MG/1
5 TABLET ORAL EVERY 6 HOURS PRN
Qty: 30 TABLET | Refills: 0 | Status: SHIPPED | OUTPATIENT
Start: 2025-03-21 | End: 2025-03-29

## 2025-03-21 RX ORDER — CEFAZOLIN SODIUM 1 G/3ML
INJECTION, POWDER, FOR SOLUTION INTRAMUSCULAR; INTRAVENOUS
Status: DISCONTINUED | OUTPATIENT
Start: 2025-03-21 | End: 2025-03-21 | Stop reason: SDUPTHER

## 2025-03-21 RX ORDER — MIDAZOLAM HYDROCHLORIDE 2 MG/2ML
2 INJECTION, SOLUTION INTRAMUSCULAR; INTRAVENOUS ONCE
Status: COMPLETED | OUTPATIENT
Start: 2025-03-21 | End: 2025-03-21

## 2025-03-21 RX ORDER — TRANEXAMIC ACID 100 MG/ML
INJECTION, SOLUTION INTRAVENOUS
Status: DISCONTINUED | OUTPATIENT
Start: 2025-03-21 | End: 2025-03-21 | Stop reason: SDUPTHER

## 2025-03-21 RX ORDER — LIDOCAINE HYDROCHLORIDE 20 MG/ML
INJECTION, SOLUTION EPIDURAL; INFILTRATION; INTRACAUDAL; PERINEURAL
Status: DISCONTINUED | OUTPATIENT
Start: 2025-03-21 | End: 2025-03-21 | Stop reason: SDUPTHER

## 2025-03-21 RX ORDER — SENNA AND DOCUSATE SODIUM 50; 8.6 MG/1; MG/1
1 TABLET, FILM COATED ORAL 2 TIMES DAILY
Status: DISCONTINUED | OUTPATIENT
Start: 2025-03-21 | End: 2025-03-22 | Stop reason: HOSPADM

## 2025-03-21 RX ORDER — SODIUM CHLORIDE, SODIUM LACTATE, POTASSIUM CHLORIDE, CALCIUM CHLORIDE 600; 310; 30; 20 MG/100ML; MG/100ML; MG/100ML; MG/100ML
INJECTION, SOLUTION INTRAVENOUS CONTINUOUS
Status: DISCONTINUED | OUTPATIENT
Start: 2025-03-21 | End: 2025-03-21 | Stop reason: HOSPADM

## 2025-03-21 RX ORDER — ASPIRIN 81 MG/1
81 TABLET ORAL 2 TIMES DAILY
Status: DISCONTINUED | OUTPATIENT
Start: 2025-03-21 | End: 2025-03-22 | Stop reason: HOSPADM

## 2025-03-21 RX ORDER — HYDROMORPHONE HYDROCHLORIDE 1 MG/ML
0.5 INJECTION, SOLUTION INTRAMUSCULAR; INTRAVENOUS; SUBCUTANEOUS EVERY 5 MIN PRN
Status: DISCONTINUED | OUTPATIENT
Start: 2025-03-21 | End: 2025-03-21 | Stop reason: HOSPADM

## 2025-03-21 RX ORDER — EPHEDRINE SULFATE 50 MG/ML
INJECTION INTRAVENOUS
Status: DISCONTINUED | OUTPATIENT
Start: 2025-03-21 | End: 2025-03-21 | Stop reason: SDUPTHER

## 2025-03-21 RX ORDER — LABETALOL HYDROCHLORIDE 5 MG/ML
10 INJECTION, SOLUTION INTRAVENOUS
Status: DISCONTINUED | OUTPATIENT
Start: 2025-03-21 | End: 2025-03-21 | Stop reason: HOSPADM

## 2025-03-21 RX ORDER — BUPIVACAINE HYDROCHLORIDE 5 MG/ML
INJECTION, SOLUTION EPIDURAL; INTRACAUDAL; PERINEURAL
Status: COMPLETED | OUTPATIENT
Start: 2025-03-21 | End: 2025-03-21

## 2025-03-21 RX ADMIN — EPHEDRINE SULFATE 10 MG: 50 INJECTION INTRAVENOUS at 14:20

## 2025-03-21 RX ADMIN — PHENYLEPHRINE HYDROCHLORIDE 30 MCG/MIN: 10 INJECTION INTRAVENOUS at 12:33

## 2025-03-21 RX ADMIN — WATER 2000 MG: 1 INJECTION INTRAMUSCULAR; INTRAVENOUS; SUBCUTANEOUS at 22:27

## 2025-03-21 RX ADMIN — HYDROMORPHONE HYDROCHLORIDE 0.5 MG: 1 INJECTION, SOLUTION INTRAMUSCULAR; INTRAVENOUS; SUBCUTANEOUS at 14:36

## 2025-03-21 RX ADMIN — PROPOFOL 40 MCG/KG/MIN: 10 INJECTION, EMULSION INTRAVENOUS at 12:28

## 2025-03-21 RX ADMIN — ACETAMINOPHEN 650 MG: 325 TABLET ORAL at 19:36

## 2025-03-21 RX ADMIN — SENNOSIDES AND DOCUSATE SODIUM 1 TABLET: 50; 8.6 TABLET ORAL at 22:27

## 2025-03-21 RX ADMIN — SODIUM CHLORIDE, POTASSIUM CHLORIDE, SODIUM LACTATE AND CALCIUM CHLORIDE: 600; 310; 30; 20 INJECTION, SOLUTION INTRAVENOUS at 12:05

## 2025-03-21 RX ADMIN — BUPIVACAINE HYDROCHLORIDE 30 ML: 5 INJECTION, SOLUTION EPIDURAL; INTRACAUDAL; PERINEURAL at 12:10

## 2025-03-21 RX ADMIN — DEXMEDETOMIDINE 10 MCG: 100 INJECTION, SOLUTION, CONCENTRATE INTRAVENOUS at 12:34

## 2025-03-21 RX ADMIN — MEPIVACAINE HYDROCHLORIDE 50 MG: 15 INJECTION, SOLUTION EPIDURAL; INFILTRATION at 12:27

## 2025-03-21 RX ADMIN — SODIUM CHLORIDE: 0.9 INJECTION, SOLUTION INTRAVENOUS at 15:14

## 2025-03-21 RX ADMIN — HYDROMORPHONE HYDROCHLORIDE 0.5 MG: 1 INJECTION, SOLUTION INTRAMUSCULAR; INTRAVENOUS; SUBCUTANEOUS at 22:26

## 2025-03-21 RX ADMIN — PHENYLEPHRINE HYDROCHLORIDE 80 MCG: 10 INJECTION INTRAVENOUS at 14:18

## 2025-03-21 RX ADMIN — PROPOFOL 25 MG: 10 INJECTION, EMULSION INTRAVENOUS at 12:27

## 2025-03-21 RX ADMIN — TRANEXAMIC ACID 1000 MG: 100 INJECTION, SOLUTION INTRAVENOUS at 12:55

## 2025-03-21 RX ADMIN — LIDOCAINE HYDROCHLORIDE 20 MG: 20 INJECTION, SOLUTION EPIDURAL; INFILTRATION; INTRACAUDAL; PERINEURAL at 12:27

## 2025-03-21 RX ADMIN — PHENYLEPHRINE HYDROCHLORIDE 80 MCG: 10 INJECTION INTRAVENOUS at 14:24

## 2025-03-21 RX ADMIN — EPHEDRINE SULFATE 5 MG: 50 INJECTION INTRAVENOUS at 14:24

## 2025-03-21 RX ADMIN — DEXMEDETOMIDINE 10 MCG: 100 INJECTION, SOLUTION, CONCENTRATE INTRAVENOUS at 12:27

## 2025-03-21 RX ADMIN — MIDAZOLAM 2 MG: 1 INJECTION INTRAMUSCULAR; INTRAVENOUS at 12:11

## 2025-03-21 RX ADMIN — ASPIRIN 81 MG: 81 TABLET, COATED ORAL at 22:27

## 2025-03-21 RX ADMIN — FAMOTIDINE 20 MG: 20 TABLET, FILM COATED ORAL at 22:27

## 2025-03-21 RX ADMIN — MELOXICAM 3.75 MG: 7.5 TABLET ORAL at 19:36

## 2025-03-21 RX ADMIN — CEFAZOLIN 2 G: 1 INJECTION, POWDER, FOR SOLUTION INTRAMUSCULAR; INTRAVENOUS at 12:55

## 2025-03-21 ASSESSMENT — PAIN DESCRIPTION - FREQUENCY
FREQUENCY: INTERMITTENT
FREQUENCY: INTERMITTENT

## 2025-03-21 ASSESSMENT — PAIN DESCRIPTION - ORIENTATION
ORIENTATION: RIGHT
ORIENTATION: RIGHT

## 2025-03-21 ASSESSMENT — PAIN DESCRIPTION - LOCATION
LOCATION: INCISION;KNEE
LOCATION: KNEE

## 2025-03-21 ASSESSMENT — PAIN DESCRIPTION - DESCRIPTORS
DESCRIPTORS: ACHING
DESCRIPTORS: ACHING

## 2025-03-21 ASSESSMENT — PAIN SCALES - GENERAL
PAINLEVEL_OUTOF10: 0
PAINLEVEL_OUTOF10: 8
PAINLEVEL_OUTOF10: 7
PAINLEVEL_OUTOF10: 9

## 2025-03-21 ASSESSMENT — PAIN DESCRIPTION - PAIN TYPE
TYPE: SURGICAL PAIN
TYPE: SURGICAL PAIN

## 2025-03-21 ASSESSMENT — PAIN DESCRIPTION - ONSET
ONSET: ON-GOING
ONSET: GRADUAL

## 2025-03-21 ASSESSMENT — PAIN - FUNCTIONAL ASSESSMENT
PAIN_FUNCTIONAL_ASSESSMENT: PREVENTS OR INTERFERES SOME ACTIVE ACTIVITIES AND ADLS
PAIN_FUNCTIONAL_ASSESSMENT: PREVENTS OR INTERFERES SOME ACTIVE ACTIVITIES AND ADLS

## 2025-03-21 NOTE — H&P
Patient ID: Kuldip Rouse Jr. is a 74 y.o. male.  Pain Score: 0-No pain  Chief Complaint: Follow-up of the Right Knee    Kuldip Rouse Jr. is a 74 y.o. male who presents today for Follow-up of the Right Knee. He is status post previous left knee total knee arthroplasty without any issues. He currently is having increasing right knee pain and failure to progress which has not improved with conservative management including injections anti-inflammatories activity modification rest and bracing. Due to his limitations he would like to move forward with knee replacement he thinks. Pt reports difficulty with stair climbing. He didn't gain a response from his previous injection.     Past Medical History:   Diagnosis Date   no relevant past medical history     Past Surgical History:   Procedure Laterality Date   JOINT REPLACEMENT   KNEE SURGERY   NO RELEVANT SURGERIES   SPINE SURGERY     Family History   Problem Relation Age of Onset   No Known Problems Mother   Coronary artery disease Father   No Known Problems Brother   No Known Problems Sister     Social History     Tobacco Use   Smoking status: Never   Smokeless tobacco: Never   Substance Use Topics   Alcohol use: Yes   Drug use: Never     Review of Systems   2/12/2025    Constitutional: Unexplained: Negative  Genitourinary: Frequent Urination: Negative  HEENT: Vision Loss: Negative  Neurological: Memory Loss: Negative  Integumentary: Rash: Negative  Cardiovascular: Palpatations: Negative  Hematologic: Bruises/Bleeds Easily: Negative  Gastrointestinal: Constipation: Negative  Immunological: Seasonal Allergies: Negative  Musculoskeletal: Joint Pain: Positive  Objective:     Vitals:   02/12/25 1347   Weight: 238 lb (108 kg)   Height: 6'     Constitutional: No acute distress. Well nourished. Well developed.  Psychiatric: Alert and oriented x3.  Skin: No marked skin ulcers.  Neurological: No apparent deficits    Right knee exam has varus alignment and range of motion with no  pain. There is good stability in all planes and no crepitance and moderate effusion. The Lachman's and drawer are negative. Pseudo opening on valgus stress testing. The patella tracks well. There is significant medial joint line tenderness. The leg is neurovascular intact distally with no skin changes rashes erythema deformity or bruising about the leg. There is no edema and good pulses distally. He has a slight loss of extension.    Radiographs:       No imaging obtained     Assessment:     1. Primary osteoarthritis of both knees     Patient Active Problem List   Diagnosis   Rupture of tibialis anterior tendon   Degenerative arthritis of left knee   Essential hypertension   Achilles tendinitis, left leg   Posterior tibial tendonitis, left     Plan:     He has failed conservative management and is limited enough that his activities of daily living are no longer possible as he wants to do them. Total knee replacement was discussed with the patient today including the risks benefits and possible complications. They have failed conservative management including injections, nonsteroidal anti-inflammatory medications, activity modification, rest, and bracing, the x-rays show severe middle compartment arthritis with bone-on-bone contact and 100% loss of medial joint space with medial shift of the femur on the tibia.. It was discussed with them that the surgery is done on same day surgery basis. The patient will arrive the day of surgery. Surgery will be done under spinal and block with sedation. The main risks of the operation are blood loss infection and persistent pain. Surgery would last approximately an hour and a half the patient was then go to the floor with a would be expected to walk on the 1st day. Prior to discharge that would be required to walk a certain distance be able to get up on their own and ambulate stairs. Patient would be required to be on a blood thinner after surgery. This will be required for at

## 2025-03-21 NOTE — OP NOTE
Total Knee Operative Report      Patient: Kuldip Rouse Jr. MRN: [id number]  SSN: [id number]    YOB: 1950  Age: 74 y.o.  Sex: male      DATE OF SURGERY:  3/21/2025    Indications: This is a 74 yrs male who presents with  right knee DJD.   The patient was admitted for surgery as conservative measures have failed.    Preoperative Diagnosis:  Right Knee Osteoarthritis    Postoperative Diagnosis: Right Knee Osteoarthritis    Surgeon: Delfina Latham MD    Assistant: Kaley Bermudez PA-C    Anesthesia: Spinal    Procedure: RIGHT TOTAL KNEE REPLACEMENT     Procedure Details:  After the procedure had been explained to the patient including the risks, benefits and possible complications, Kuldip Rouse Jr. signed the informed consent. Kuldip Rouse Jr. was then brought to the operating room and positioned on the operating table in a supine position and was anethestized with anesthesia.  A manning catheter was placed preoperatively and IV Ancef 2gm  was administered.  A pneumatic tourniquet was placed about the limb and the right leg was prepped and draped in the usual sterile manner.    An anterior longitudinal incision was accomplished just medial to the tibial tubercle and extending approximal 6 centimeters proximal to the superior pole of the patella.  A medial parapatellar capsular incision was performed. The medial capsular flap was elevated around to the insertion of the semimembranous tendon.  The patella was everted and the knee flexed and externally rotated.  The medial and lateral menisci were excised.  The lateral half of the fat pad excised and the patella femoral ligament was released.  The anterior cruciate ligament was resected and the posterior cruciate ligament was substituted. The Knee was flexed to 90 degrees and an intramedullary canal entry hole was drilled just anterior to the PCL insertion on the femur. The intramedullary tram was inserted and the cutting guide used this to reference for a  resection of 12 mm off of the distal femur in approx 6 degrees of valgus. Using extramedullary instrumentation, the tibial cut was then accomplished with three degrees of posterior slope.  Approxiamately 2 mm of bone was removed from the medial side of the tibia.        The flexion and extension gaps were assessed.  The sizing guide for the femoral component was then placed and a size 11 was chosen.  The guide was set in  3 degrees external rotation to the epicondylar axis to create an equal flexion gap. The appropriate cutting blocks were then utilized to perform the anterior,  Posterior, and  Chamfer cuts  with appropriate lateral translation accomplished.       The tibia was sized to a H component.  The tibial base plate was pinned into place and stem site prepared. The femoral trial with the box guide was set for the posterior cruciate substituting prosthesis and these cuts were made also. Lug holes were drilled to accommodate the femoral component.        A preliminary range of motion was accomplished with the above size trial components.  A 13 millimeter polyethylene insert allowed the patient to obtain full extension as well as appropriate flexion.  The patient's ligaments were stable in flexion and extension to medial and lateral stressing and the alignment was through the appropriate mechanical axis.  The patella was then measured using the calipers and found to be 26mm thick. Using the cutting guide, 8.5 mm were then resected to accommodate the size 35 patella three peg button. The appropriate guide was then used to drill the three pegs.    All trial components were removed and the cut surfaces prepared for cementing with irrigation and debridement of the bone interstices.  There were no femoral deficiencies.  There were no tibial deficiencies.  No augmentation was utilized.  The tourniquet was inflated for the cementing progress. Two package of cement were mixed and the permanent components cemented into

## 2025-03-21 NOTE — FLOWSHEET NOTE
03/21/25 1304   Family Communication    Relationship to Patient Spouse    Phone Number Agnieszka Rouse [phone]   Family/Significant Other Update Called   Delivery Origin Nurse   Message Disposition Family present - message delivered   Update Given Yes   Family Communication   Family Update Message Procedure started;Surgeon working

## 2025-03-21 NOTE — ANESTHESIA PROCEDURE NOTES
Peripheral Block    Patient location during procedure: pre-op  Reason for block: post-op pain management and at surgeon's request  Start time: 3/21/2025 12:10 PM  Staffing  Performed: anesthesiologist   Anesthesiologist: Enoch Savage MD  Performed by: Enoch Savage MD  Authorized by: Enoch Savage MD    Preanesthetic Checklist  Completed: patient identified, IV checked, site marked, risks and benefits discussed, surgical/procedural consents, equipment checked, pre-op evaluation, timeout performed, anesthesia consent given, oxygen available, monitors applied/VS acknowledged and fire risk safety assessment completed and verbalized  Peripheral Block   Patient position: supine  Prep: ChloraPrep  Provider prep: mask and sterile gloves  Patient monitoring: cardiac monitor, continuous pulse ox, frequent blood pressure checks, IV access and oxygen  Block type: Saphenous and Femoral  Laterality: right  Injection technique: single-shot  Guidance: ultrasound guided  Local infiltration: ropivacaine  Local infiltration: ropivacaine    Needle   Needle type: insulated echogenic nerve stimulator needle   Needle gauge: 21 G  Needle localization: ultrasound guidance  Needle length: 10 cm  Assessment   Injection assessment: negative aspiration for heme, no paresthesia on injection, local visualized surrounding nerve on ultrasound and no intravascular symptoms  Paresthesia pain: none  Slow fractionated injection: yes  Hemodynamics: stable  Outcomes: uncomplicated and patient tolerated procedure well    Medications Administered  BUPivacaine (MARCAINE) PF injection 0.5% - Perineural   30 mL - 3/21/2025 12:10:00 PM

## 2025-03-21 NOTE — DISCHARGE INSTRUCTIONS
After Hospital Care Plan:  Discharge Instructions Knee Replacement-Dr. Latham      Patient Name: Kuldip Rouse Jr.  Date of procedure: 03/21/2025   Procedure: RIGHT TOTAL KNEE REPLACEMENT   Surgeon: Surgeons and Role: Delfina Latham MD (Jody)     PCP: Jonny Velasquez MD  Date of discharge: 03/22/2025    Diet  Regular diet or usual diet as at home.  Drink plenty of fluids. Eat foods high in fiber and protein and low in fat. Avoid alcoholic beverages. Avoid smoking.    Activities  You are going home a well person, so be as active as possible.  Walk with your walker or crutches weight bearing as tolerated-wean as tolerated.  Avoid low chairs and slippery surfaces.  Avoid twisting your knee.  Do not sit longer than 45 minutes at a time.  During the daytime, get up every hour and take a brief walk.      Exercises  Perform your exercise routine 3 times a day as instructed by the physical therapist.  Gradually increase the repetitions of the exercises.  You may place an ice bag on your knee for 15-20 minutes after exercising.  You should walk daily, each time lengthening your walking distance as your strength improves.  Be sure to work on getting your knee out all the way straight.   Do not place a pillow under your knee when resting.        Medications  Take  Aspirin 81mg 1 tablet twice a day for 4 weeks.  Take a multivitamin with iron once a day for a month. Take a stool softener daily (such as Senokot-S or Colace) to prevent constipation while you are taking iron and pain medication.  If constipation occurs, take a laxative (such as Dulcolax tablets, Milk of Magnesia, or suppository).  Take pain medication with food.  You will find that you will decrease the amount you use as your pain lessens.    You have been given Oxycodone 5mg tablets, Take 1-2 tablets every 4-6 hours as needed for pain.  It is ok to take Tylenol and Ibuprofen in addition to your pain meds  Please try to transition off of your Narcotic pain meds      500mg tablets DO NOT take more than 8 tablets in 24 hours    If you need to reach us urgently after hours or on weekends please call [phone] and ask to speak to the doctor on call.

## 2025-03-21 NOTE — PROGRESS NOTES
1620 VS stable. Awake and alert. Resting comfortably. Patient denies nausea. No signs of excessive bleeding. Tolerating ice chips without difficulty. Ready to transfer from PACU.

## 2025-03-21 NOTE — ANESTHESIA PROCEDURE NOTES
Spinal Block    End time: 3/21/2025 1:33 AM  Reason for block: primary anesthetic  Staffing  Performed by: Chandana Davis III, APRN - CRNA  Authorized by: Enoch Savage MD    Spinal Block  Patient position: sitting  Prep: ChloraPrep and site prepped and draped  Patient monitoring: continuous pulse ox, continuous capnometry, frequent blood pressure checks and oxygen  Approach: midline  Location: L2/L3  Provider prep: mask and sterile gloves  Local infiltration: lidocaine  Needle  Needle type: pencil-tip   Needle gauge: 24 G  Needle length: 4 in  Assessment  Swirl obtained: Yes  CSF: clear  Attempts: 1  Hemodynamics: stable  Preanesthetic Checklist  Completed: patient identified, IV checked, site marked, risks and benefits discussed, surgical/procedural consents, equipment checked, pre-op evaluation, timeout performed, anesthesia consent given, oxygen available, monitors applied/VS acknowledged, fire risk safety assessment completed and verbalized and blood product R/B/A discussed and consented

## 2025-03-21 NOTE — ANESTHESIA POSTPROCEDURE EVALUATION
Department of Anesthesiology  Postprocedure Note    Patient: Kuldip Rouse Jr.  MRN: [id number]  YOB: 1950  Date of evaluation: 3/21/2025    Procedure Summary       Date: 03/21/25 Room / Location: [facility] MAIN OR [address] [place] [address] MAIN OR    Anesthesia Start: 1220 Anesthesia Stop:     Procedure: RIGHT TOTAL KNEE REPLACEMENT (SPINAL WITH BLOCK) (Right: Knee) Diagnosis:       Primary osteoarthritis of right knee      (Primary osteoarthritis of right knee [M17.11])    Providers: Delfina Latham MD (Jody) Responsible Provider: Enoch Savage MD    Anesthesia Type: MAC, Spinal ASA Status: 2            Anesthesia Type: MAC, Spinal    Anabela Phase I: Anabela Score: 10    Anabela Phase II:      Anesthesia Post Evaluation    Patient location during evaluation: PACU  Patient participation: complete - patient participated  Level of consciousness: awake  Airway patency: patent  Nausea & Vomiting: no nausea  Cardiovascular status: hemodynamically stable  Respiratory status: acceptable  Hydration status: stable  Pain management: adequate    No notable events documented.

## 2025-03-22 VITALS
OXYGEN SATURATION: 95 % | HEART RATE: 73 BPM | TEMPERATURE: 98.4 F | RESPIRATION RATE: 17 BRPM | DIASTOLIC BLOOD PRESSURE: 58 MMHG | SYSTOLIC BLOOD PRESSURE: 134 MMHG

## 2025-03-22 LAB
HCT VFR BLD AUTO: 34.7 % (ref 36.6–50.3)
HGB BLD-MCNC: 11.3 G/DL (ref 12.1–17)

## 2025-03-22 PROCEDURE — 97110 THERAPEUTIC EXERCISES: CPT

## 2025-03-22 PROCEDURE — 97530 THERAPEUTIC ACTIVITIES: CPT

## 2025-03-22 PROCEDURE — G0378 HOSPITAL OBSERVATION PER HR: HCPCS

## 2025-03-22 PROCEDURE — 96376 TX/PRO/DX INJ SAME DRUG ADON: CPT

## 2025-03-22 PROCEDURE — 6360000002 HC RX W HCPCS: Performed by: ORTHOPAEDIC SURGERY

## 2025-03-22 PROCEDURE — 85014 HEMATOCRIT: CPT

## 2025-03-22 PROCEDURE — 97165 OT EVAL LOW COMPLEX 30 MIN: CPT

## 2025-03-22 PROCEDURE — 2500000003 HC RX 250 WO HCPCS: Performed by: ORTHOPAEDIC SURGERY

## 2025-03-22 PROCEDURE — 97116 GAIT TRAINING THERAPY: CPT

## 2025-03-22 PROCEDURE — 96374 THER/PROPH/DIAG INJ IV PUSH: CPT

## 2025-03-22 PROCEDURE — 97535 SELF CARE MNGMENT TRAINING: CPT

## 2025-03-22 PROCEDURE — 85018 HEMOGLOBIN: CPT

## 2025-03-22 PROCEDURE — 6370000000 HC RX 637 (ALT 250 FOR IP): Performed by: ORTHOPAEDIC SURGERY

## 2025-03-22 RX ADMIN — SODIUM CHLORIDE, PRESERVATIVE FREE 10 ML: 5 INJECTION INTRAVENOUS at 08:54

## 2025-03-22 RX ADMIN — ASPIRIN 81 MG: 81 TABLET, COATED ORAL at 08:54

## 2025-03-22 RX ADMIN — OXYCODONE 10 MG: 5 TABLET ORAL at 12:32

## 2025-03-22 RX ADMIN — ACETAMINOPHEN 650 MG: 325 TABLET ORAL at 12:32

## 2025-03-22 RX ADMIN — SENNOSIDES AND DOCUSATE SODIUM 1 TABLET: 50; 8.6 TABLET ORAL at 08:56

## 2025-03-22 RX ADMIN — WATER 2000 MG: 1 INJECTION INTRAMUSCULAR; INTRAVENOUS; SUBCUTANEOUS at 05:30

## 2025-03-22 RX ADMIN — FAMOTIDINE 20 MG: 20 TABLET, FILM COATED ORAL at 08:55

## 2025-03-22 RX ADMIN — MELOXICAM 3.75 MG: 7.5 TABLET ORAL at 08:55

## 2025-03-22 RX ADMIN — HYDROMORPHONE HYDROCHLORIDE 0.5 MG: 1 INJECTION, SOLUTION INTRAMUSCULAR; INTRAVENOUS; SUBCUTANEOUS at 02:03

## 2025-03-22 RX ADMIN — HYDROMORPHONE HYDROCHLORIDE 0.5 MG: 1 INJECTION, SOLUTION INTRAMUSCULAR; INTRAVENOUS; SUBCUTANEOUS at 05:30

## 2025-03-22 RX ADMIN — ACETAMINOPHEN 650 MG: 325 TABLET ORAL at 00:00

## 2025-03-22 RX ADMIN — OXYCODONE 10 MG: 5 TABLET ORAL at 08:54

## 2025-03-22 ASSESSMENT — PAIN DESCRIPTION - ORIENTATION
ORIENTATION: RIGHT

## 2025-03-22 ASSESSMENT — PAIN - FUNCTIONAL ASSESSMENT
PAIN_FUNCTIONAL_ASSESSMENT: ACTIVITIES ARE NOT PREVENTED
PAIN_FUNCTIONAL_ASSESSMENT: PREVENTS OR INTERFERES SOME ACTIVE ACTIVITIES AND ADLS
PAIN_FUNCTIONAL_ASSESSMENT: ACTIVITIES ARE NOT PREVENTED

## 2025-03-22 ASSESSMENT — PAIN SCALES - GENERAL
PAINLEVEL_OUTOF10: 7
PAINLEVEL_OUTOF10: 8
PAINLEVEL_OUTOF10: 7

## 2025-03-22 ASSESSMENT — PAIN DESCRIPTION - LOCATION
LOCATION: INCISION;KNEE
LOCATION: LEG
LOCATION: KNEE

## 2025-03-22 ASSESSMENT — PAIN DESCRIPTION - FREQUENCY
FREQUENCY: INTERMITTENT
FREQUENCY: INTERMITTENT

## 2025-03-22 ASSESSMENT — PAIN DESCRIPTION - PAIN TYPE
TYPE: SURGICAL PAIN
TYPE: SURGICAL PAIN

## 2025-03-22 ASSESSMENT — PAIN DESCRIPTION - DESCRIPTORS
DESCRIPTORS: ACHING

## 2025-03-22 ASSESSMENT — PAIN DESCRIPTION - ONSET
ONSET: ON-GOING
ONSET: ON-GOING

## 2025-03-22 NOTE — CARE COORDINATION
Transition of Care Plan  RUR N/A  Observation Status  Medicare and [employer] Medicare Outpatient Observation Notice (MOON)/ [place] Outpatient Observation Notice (VOON) provided to patient/representative with verbal explanation of the notice. Time allotted for questions regarding the notice.  Patient /representative provided a completed copy of the MOON/VOON notice.  Copy placed on bedside chart.     Admission  Right total knee replacement    Met with patient in his room -- patient alert and oriented  Confirmed demographics   PCP and insurance.   Lives with his wife in two level home and was self care and independent prior to admission -- Has dme from previous left knee replacement 2020  Good family support and is being discharged today    DME  RW  crutches cane      Patient is in agreement with -- chose [facility]  [phone]  Referral sent via [facility] and accepted   Name and number placed on AVS    Wife will transport home    Wife  Agnieszka Rouse   [phone]       03/22/25 1113   Service Assessment   Patient Orientation Alert and Oriented   Cognition Alert   History Provided By Patient   Primary Caregiver Self   Support Systems Spouse/Significant Other   Prior Functional Level Independent in ADLs/IADLs   Current Functional Level Independent in ADLs/IADLs   Social/Functional History   Lives With Spouse   Type of Home House   Prior Level of Assist for ADLs Independent   Prior Level of Assist for Homemaking Independent   Ambulation Assistance Independent   Prior Level of Assist for Transfers Independent   Active  Yes   Occupation Retired   Discharge Planning   DME Ordered? No   Potential Assistance Purchasing Medications No   Type of Home Care Services PT;OT   Services At/After Discharge   Transition of Care Consult (CM Consult) Discharge Planning   Services At/After Discharge Home Health   Mode of Transport at Discharge Other (see comment)  (wife)   Condition of Participation: Discharge Planning   The

## 2025-03-22 NOTE — PROGRESS NOTES
TOTAL KNEE ARTHROPLASTY DAILY NOTE    POD  1 Day Post-Op s/p Procedure(s):  RIGHT TOTAL KNEE REPLACEMENT (SPINAL WITH BLOCK)     ASSESSMENT / PLAN :   Pain Control : adequately controlled, PO oxycodone 5 mg q4-6h #30 sent to [facility] pharmacy by Dr. Latham  Overnight Issues : none  Wound or incisional issue : none   Therapy / Weight Bearing Recommendations : WBAT with assistive device as needed   DVT Prophylaxis : aspirin 81 mg BID x 30 days    Disposition : ready for discharge home, home health scheduled for tomorrow   Follow up: Follow up scheduled for 4/3/2025 with Priscilla Bermudez PA-C          SUBJECTIVE :     ANGELES overnight. Pain controlled. Worked with physical therapy this morning. Tolerating a regular diet. Denies CP/SOB/Abd pain/or other complaints.       OBJECTIVE :     Vitals:    03/22/25 0853   BP: (!) 134/58   Pulse: 73   Resp: 17   Temp: 98.4 °F (36.9 °C)   SpO2: 95%         Alert and oriented x3.  NAD  Patient seated in chair at bedside.   Examination of the right knee reveals that the dressing is clean, dry and intact.   Motor Exam :   - fires TA / EHL / GSC  - SILT SP / DP / TN  - warm and well perfused distally      Labs:  Recent Labs     03/22/25  0501   HGB 11.3*   HCT 34.7*               Lisa Ann PA-C  [facility]  03/22/25 10:12 AM

## 2025-03-22 NOTE — PROGRESS NOTES
Pt states Oxycodone is ineffective for him and did not work last time. He wants to take the dilaudid iv and speak to the Dr. Latham in the AM for an oral med for home. (He stated last time , he took a combo that contained excedrin, but did not remember what it was.)

## 2025-03-22 NOTE — PROGRESS NOTES
OCCUPATIONAL THERAPY EVALUATION/DISCHARGE  Patient: Kuldip Rouse Jr. (74 y.o. male)  Date: 3/22/2025  Primary Diagnosis: Primary osteoarthritis of right knee [M17.11]  Degenerative joint disease [M19.90]  Procedure(s) (LRB):  RIGHT TOTAL KNEE REPLACEMENT (SPINAL WITH BLOCK) (Right) 1 Day Post-Op     Precautions: Weight Bearing Right Lower Extremity Weight Bearing: Weight Bearing As Tolerated                ASSESSMENT :  Based on the objective data below, the patient presents with decreased R knee ROM, impaired functional mobility, and decline in functional status for ADLs s/p POD 1 R TKR. Pt is SBA for bed mobility, SBA sit <>stand with verbal cues for hand placement. Pt completed functional mobility with SBA for standing ADLs in bathroom, min verbal cues for safety with RW management over commode. Reviewed LB dressing, bathing and toileting at home and all questions answered. Cleared from OT.     Functional Outcome Measure:  The patient scored 80 on the barthel outcome measure which is indicative of independence.      Further skilled acute occupational therapy is not indicated at this time.     PLAN :  Recommend with staff: OOB, ambulate, bathroom with Ax 1 ADLs    Recommendation for discharge: (in order for the patient to meet his/her long term goals):   No skilled occupational therapy    Other factors to consider for discharge: no additional factors    IF patient discharges home will need the following DME: has all needed DME     SUBJECTIVE:   Patient stated, “how about icing? What should I avoid?.”    OBJECTIVE DATA SUMMARY:     Past Medical History:   Diagnosis Date    Arthritis     Cancer (HCC) 2018    SKIN CANCER    Foot drop, left     Torn Achilles tendon     LEFT     Past Surgical History:   Procedure Laterality Date    COLONOSCOPY      ORTHOPEDIC SURGERY Left 2012    SCREWS PLACED IN LEFT CLAVICLE    ORTHOPEDIC SURGERY  1981    cervical fusion    SKIN BIOPSY      SKIN CANCER EXCISION      EAR PER PT

## 2025-03-22 NOTE — PROGRESS NOTES
Patient assessed for readiness to ambulate.   Patient ambulated with assistance of 1 nurses. Patient ambulated with gait belt and walker. Patient walked to NURSES STATION. WITH wheeled walker . Patient returned safely to bed. Pt also stood and urinated upon arrival to floor  and x2 more times.  Pt states feels unsteady in rt leg . Also understating his pain. Will continue with pain education and pain med encouragement  Vitals:    03/21/25 2000   BP: 134/73   Pulse: 81   Resp: 18   Temp: 98.6 °F (37 °C)   SpO2:

## 2025-03-22 NOTE — PROGRESS NOTES
PHYSICAL THERAPY EVALUATION/DISCHARGE    Patient: Kuldip Rouse Jr. (74 y.o. male)  Date: 3/22/2025  Primary Diagnosis: Primary osteoarthritis of right knee [M17.11]  Degenerative joint disease [M19.90]  Procedure(s) (LRB):  RIGHT TOTAL KNEE REPLACEMENT (SPINAL WITH BLOCK) (Right) 1 Day Post-Op   Precautions: Restrictions/Precautions  Restrictions/Precautions: Weight Bearing Lower Extremity Weight Bearing Restrictions  Right Lower Extremity Weight Bearing: Weight Bearing As Tolerated          ASSESSMENT AND RECOMMENDATIONS:  Based on the objective data described below, the patient presents s/p recent R TKR POD #1. Pt PLOF: independent with ADLs and IADLs. Patient lives with wife in two story home, 2 steps to enter, B rails.   Pt received in sitting with LE in dependent position at 40 deg, educated on keeping LE in fully bent or straight and avoiding resting in flexion while resting. Patient overall supervision for transfers to/from standing with RW. Pt participated in  gait training 340ft feet with RW and supervision to mod I, shortened steps and flexed gait pattern, heavy cues for improving posture upright and focusing on technique which pt demos good adherence to with cues. Pt tolerates stair navigation well with 1 or 2 rails. Patient returned to chair at end of session.   Patient's mobility was on target for POD#1. Will address more exercises, increase gait distance, negotiate stairs and assess for discharge at PM PT session once wife arrives. Patient instructed NOT to get up from bed, chair or commode without calling for assistance. Initiated post total joint exercise protocol and wrote same on [employer]. Pt cleared to DC from PT standpoint once wife educated.     PM session: Pt wife present, reeducated pt and wife as patient having difficulty with self-maintaining posture and alignment with gait and TE. Performed and reviewed technique for TE heel slides, QS, AP, and described LAQ. Pt requires                                                                                                                                                                                                                                                                                                                                              [facility] AM-PAC®      Basic Mobility Inpatient Short Form (6-Clicks) Version 2  How much HELP from another person do you currently need... (If the patient hasn't done an activity recently, how much help from another person do you think they would need if they tried?) Total A Lot A Little None   1.  Turning from your back to your side while in a flat bed without using bedrails? []  1 []  2 []  3  [x]  4   2.  Moving from lying on your back to sitting on the side of a flat bed without using bedrails? []  1 []  2 []  3  [x]  4   3.  Moving to and from a bed to a chair (including a wheelchair)? []  1 []  2 []  3  [x]  4   4. Standing up from a chair using your arms (e.g. wheelchair or bedside chair)? []  1 []  2 []  3  [x]  4   5.  Walking in hospital room? []  1 []  2 []  3  [x]  4   6.  Climbing 3-5 steps with a railing? []  1 []  2 []  3  [x]  4     Raw Score: 24/24                            Cutoff score <=171,2,3 had higher odds of discharging home with home health or need of SNF/IPR.    1. Jessica Zayas, Laury Aguilar, Armond Quinteros, Marley Lopez, José Manuel Calderón, Elieser Zayas.  Validity of the -PAC “6-Clicks” Inpatient Daily Activity and Basic Mobility Short Forms. Physical Therapy Mar 2014, [phone]; DOI: 10.2522/ptj.58445604  2. Robert RILEY, Maribell LEE, Shawna LEE, Alma LEE. Association of AM-PAC \"6-Clicks\" Basic Mobility and Daily Activity Scores With Discharge Destination. Phys Ther. 2021 Apr 4;101(4):[id number]. doi: 10.1093/ptj/pfhj968. PMID: [id number].  3. Nancy LEE, Baldomero D, Mari S, Shane K, Heron S. Activity Measure for Post-Acute Care

## (undated) DEVICE — SYR LR LCK 1ML GRAD NSAF 30ML --

## (undated) DEVICE — CONTAINER,SPECIMEN,3OZ,OR STRL: Brand: MEDLINE

## (undated) DEVICE — CARTRIDGE BNE CEM MIX UNIV TWR VAC ROTOR BRK OFF NOZ W/O

## (undated) DEVICE — TUBING, SUCTION, 1/4" X 12', STRAIGHT: Brand: MEDLINE

## (undated) DEVICE — CEMENT BNE 40GM FULL DOSE PMMA W/O GENT M VISC N RADPQ LNG

## (undated) DEVICE — SCRUB DRY SURG EZ SCRUB BRUSH PREOPERATIVE GRN

## (undated) DEVICE — SUTURE ETHBND EXCEL SZ 1 L30IN NONABSORBABLE GRN L36MM CT-1 X425H

## (undated) DEVICE — SUTURE MONOCRYL + SZ 3-0 L27IN ABSRB UD PS1 L24MM 3/8 CIR REV MCP936H

## (undated) DEVICE — ZIMMER® STERILE DISPOSABLE TOURNIQUET CUFF WITH PLC, DUAL PORT, SINGLE BLADDER, 34 IN. (86 CM)

## (undated) DEVICE — BANDAGE COMPR W6INXL12FT SMOOTH FOR LIMB EXSANG ESMARCH

## (undated) DEVICE — PIN HOLDING HDLSS 3.2X75 MM TROCAR ZUK

## (undated) DEVICE — SUTURE VICRYL ABSORBABLE BRAIDED 2-0 CT 36 IN DA UD  VCP957H

## (undated) DEVICE — SPONGE GZ W4XL4IN COT 12 PLY TYP VII WVN C FLD DSGN STERILE

## (undated) DEVICE — INSTRUMENT SCREW BNE L25MM DIA2.5MM KNEE FULL THRD HEX FEM PERSONA

## (undated) DEVICE — SYRINGE MED 30ML STD CLR PLAS LUERLOCK TIP N CTRL DISP

## (undated) DEVICE — ELECTRODE PT RET AD L9FT HI MOIST COND ADH HYDRGEL CORDED

## (undated) DEVICE — INTENT OT USE PROVIDES A STERILE INTERFACE BETWEEN THE OPERATING ROOM SURGICAL LAMPS (NON-STERILE) AND THE SURGEON OR STAFF WORKING IN THE STERILE FIELD.: Brand: ASPEN® ALC PLUS LIGHT HANDLE COVER

## (undated) DEVICE — STERILE POLYISOPRENE POWDER-FREE SURGICAL GLOVES WITH EMOLLIENT COATING: Brand: PROTEXIS

## (undated) DEVICE — TOWEL,OR,DSP,ST,NATURAL,DLX,4/PK,20PK/CS: Brand: MEDLINE

## (undated) DEVICE — DRAPE,EXTREMITY,89X128,STERILE: Brand: MEDLINE

## (undated) DEVICE — SOL IRR SOD CHL 0.9% TITAN XL CNTNR 3000ML

## (undated) DEVICE — GLOVE,SURG,SENSICARE,ALOE,LF,PF,7: Brand: MEDLINE

## (undated) DEVICE — INFECTION CONTROL KIT SYS

## (undated) DEVICE — TOTAL TRAY, 16FR 10ML SIL FOLEY, URN: Brand: MEDLINE

## (undated) DEVICE — SCR BNE CORT HEX FEM 2.5X25MM

## (undated) DEVICE — SCRUBIN SCRUB BRUSH DRY STER: Brand: MEDLINE INDUSTRIES, INC.

## (undated) DEVICE — BLADE SAW W083XL354IN THK0047IN CUT THK0047IN SAG FLR

## (undated) DEVICE — 3M™ IOBAN™ 2 ANTIMICROBIAL INCISE DRAPE 6651EZ: Brand: IOBAN™ 2

## (undated) DEVICE — STRYKER PERFORMANCE SERIES SAGITTAL BLADE: Brand: STRYKER PERFORMANCE SERIES

## (undated) DEVICE — HANDPIECE SET WITH BONE CLEANING TIP AND SUCTION TUBE: Brand: INTERPULSE

## (undated) DEVICE — REM POLYHESIVE ADULT PATIENT RETURN ELECTRODE: Brand: VALLEYLAB

## (undated) DEVICE — SUTURE MCRYL SZ 3-0 L27IN ABSRB UD L24MM PS-1 3/8 CIR PRIM Y936H

## (undated) DEVICE — GLOVE SURG SZ 7 L12IN FNGR THK79MIL GRN LTX FREE

## (undated) DEVICE — BANDAGE COMPR M W6INXL10YD WHT BGE VELC E MTRX HK AND LOOP

## (undated) DEVICE — 4-PORT MANIFOLD: Brand: NEPTUNE 2

## (undated) DEVICE — CUSTOM CAST PD STR

## (undated) DEVICE — GARMENT,MEDLINE,DVT,INT,CALF,MED, GEN2: Brand: MEDLINE

## (undated) DEVICE — Device

## (undated) DEVICE — SUTURE STRATAFIX SYMMETRIC PDS + SZ 1 L18IN ABSRB VLT L48MM SXPP1A400

## (undated) DEVICE — GLOVE SURG SZ 8 L12IN FNGR THK94MIL STD WHT LTX FREE

## (undated) DEVICE — SOLUTION IRRIG 1000ML H2O STRL BLT

## (undated) DEVICE — SUTURE VCRL SZ 2-0 L36IN ABSRB UD L40MM CT 1/2 CIR J957H

## (undated) DEVICE — STERILE POLYISOPRENE POWDER-FREE SURGICAL GLOVES: Brand: PROTEXIS

## (undated) DEVICE — YANKAUER,FLEXIBLE HANDLE,REGLR CAPACITY: Brand: MEDLINE INDUSTRIES, INC.

## (undated) DEVICE — NEEDLE HYPO 22GA L1.5IN BLK POLYPR HUB S STL REG BVL STR

## (undated) DEVICE — SYRINGE 20ML LL S/C 50

## (undated) DEVICE — MARKER,SKIN,WI/RULER AND LABELS: Brand: MEDLINE

## (undated) DEVICE — SOLUTION SURG PREP 26 CC PURPREP

## (undated) DEVICE — SYR 20ML LL STRL LF --

## (undated) DEVICE — HOOD, PEEL-AWAY: Brand: FLYTE

## (undated) DEVICE — TOTAL JOINT - SMH: Brand: MEDLINE INDUSTRIES, INC.

## (undated) DEVICE — SOLUTION IRRIG 3000ML 0.9% SOD CHL FLX CONT 0797208] ICU MEDICAL INC]

## (undated) DEVICE — STRIP,CLOSURE,WOUND,MEDI-STRIP,1/2X4: Brand: MEDLINE

## (undated) DEVICE — DRAPE,REIN 53X77,STERILE: Brand: MEDLINE

## (undated) DEVICE — STRAP,POSITIONING,KNEE/BODY,FOAM,4X60": Brand: MEDLINE

## (undated) DEVICE — DRESSING ANTIMIC FOAM MEPILEX BORD POSTOP AG PROD SZ 4X12 IN